# Patient Record
Sex: FEMALE | Race: WHITE | Employment: UNEMPLOYED | URBAN - METROPOLITAN AREA
[De-identification: names, ages, dates, MRNs, and addresses within clinical notes are randomized per-mention and may not be internally consistent; named-entity substitution may affect disease eponyms.]

---

## 2017-01-18 ENCOUNTER — OFFICE VISIT (OUTPATIENT)
Dept: NEUROLOGY | Age: 37
End: 2017-01-18

## 2017-01-18 VITALS
WEIGHT: 226.6 LBS | SYSTOLIC BLOOD PRESSURE: 150 MMHG | RESPIRATION RATE: 20 BRPM | DIASTOLIC BLOOD PRESSURE: 100 MMHG | HEART RATE: 104 BPM | HEIGHT: 69 IN | OXYGEN SATURATION: 98 % | BODY MASS INDEX: 33.56 KG/M2

## 2017-01-18 DIAGNOSIS — G43.109 VERTIGINOUS MIGRAINE: Primary | ICD-10-CM

## 2017-01-18 RX ORDER — RIZATRIPTAN BENZOATE 10 MG/1
10 TABLET ORAL
Qty: 9 TAB | Refills: 3 | Status: SHIPPED | OUTPATIENT
Start: 2017-01-18 | End: 2017-04-18 | Stop reason: SDUPTHER

## 2017-01-18 NOTE — PATIENT INSTRUCTIONS

## 2017-01-18 NOTE — PROGRESS NOTES
Migraines- pretty good since last visit, she had a couple of weeks off from work   Getting them 1-2 times a month, lasting 8-12 hours usually if she can get a little bit of rest they are better     Test Results:    Doppler   EEG

## 2017-01-18 NOTE — PROGRESS NOTES
Date:  2017    Name:  Martell Cooper  :  1980  MRN:  [id number]     PCP:  No primary care provider on file. Chief Complaint   Patient presents with    Neurologic Problem     test results/migraine      HISTORY OF PRESENT ILLNESS: Follow up for vertigo and migraines. Normal carotid and normal EEG. Vertigo which was occurring about once per week. Associated with headache, light and sound sensitivity. Bright lights and noise seem to trigger if there is a lot of it. Will have 1-2 migraines a month. Current Outpatient Prescriptions   Medication Sig    nortriptyline (PAMELOR) 75 mg capsule Take 1 Cap by mouth daily.  melatonin 1 mg tablet Take  by mouth nightly.  CHOLECALCIFEROL, VITAMIN D3, (VITAMIN D3 PO) Take  by mouth.  gabapentin (NEURONTIN) 300 mg capsule Take 300 mg by mouth three (3) times daily.  ibuprofen (MOTRIN) 400 mg tablet Take 400 mg by mouth two (2) times daily as needed. No current facility-administered medications for this visit.       Allergies   Allergen Reactions    Codeine Nausea and Vomiting    Demerol [Meperidine] Nausea and Vomiting    Keflex [Cephalexin] Itching    Pcn [Penicillins] Hives     Past Medical History   Diagnosis Date    Anxiety     Arrhythmia      Sinus Tach    Arthritis     Chronic pain     Fatigue     Fibromyalgia     DAVEY (generalized anxiety disorder)     Headache     Hypertension     Muscle pain     Nausea & vomiting     Other ill-defined conditions(799.89)      fibromyalgia    Other ill-defined conditions(799.89)      hypoglycemia    Snoring     Unspecified adverse effect of anesthesia      pain at IV site and pain through the vein with anesthesia    Vertigo     Visual disturbance      Past Surgical History   Procedure Laterality Date    Hx orthopaedic       right knee    Hx orthopaedic       right ankle    Hx heent       wisdom teeth     Social History     Social History    Marital status:      Spouse name: N/A    Number of children: N/A    Years of education: N/A     Occupational History    Not on file. Social History Main Topics    Smoking status: Former Smoker     Packs/day: 0.50     Years: 6.00     Quit date: 10/15/2005    Smokeless tobacco: Never Used    Alcohol use 1.0 oz/week     2 Glasses of wine per week      Comment: 1-4/week    Drug use: No    Sexual activity: Not on file     Other Topics Concern    Not on file     Social History Narrative     Family History   Problem Relation Age of Onset    Post-op Nausea/Vomiting Mother     Cancer Mother     Heart Disease Father     Cancer Maternal Uncle     Lupus Paternal Uncle     Dementia Maternal Grandmother     Heart Disease Maternal Grandmother     Stroke Maternal Grandmother     Cancer Other        PHYSICAL EXAMINATION:    Visit Vitals    BP (!) 150/100    Pulse (!) 104    Resp 20    Ht 5' 9\" (1.753 m)    Wt 102.8 kg (226 lb 9.6 oz)    SpO2 98%    BMI 33.46 kg/m2     General:  Well defined, nourished, and groomed individual in no acute distress. Neck: Supple, nontender, no bruits, no pain with resistance to active range of motion. Heart: Regular rate and rhythm, no murmurs, rub, or gallop. Normal S1S2. Lungs:  Clear to auscultation bilaterally with equal chest expansion, no cough, no wheeze  Musculoskeletal:  Extremities revealed no edema and had full range of motion of joints. Psych:  Good mood and bright affect    NEUROLOGICAL EXAMINATION:     Mental Status:   Alert and oriented to person, place, and time     Cranial Nerves:    II, III, IV, VI:  Visual acuity grossly intact. Visual fields are normal.    Pupils are equal, round, and reactive to light and accommodation. Extra-ocular movements are full and fluid. Fundoscopic exam was benign, no ptosis or nystagmus. V-XII: Hearing is grossly intact. Facial features are symmetric, with normal sensation and strength.   The palate rises symmetrically and the tongue protrudes midline. Sternocleidomastoids 5/5. Motor Examination: Normal tone, bulk, and strength, 5/5 muscle strength throughout. Coordination:  Finger to nose was normal.   No resting or intention tremor    Gait and Station:  Steady while walking. Normal arm swing. No Rhomberg or pronator drift. No muscle wasting or fasiculations noted. Reflexes:  DTRs 2+ throughout. ASSESSMENT AND PLAN    ICD-10-CM ICD-9-CM    1. Vertiginous migraine G43.109 346.00 rizatriptan (MAXALT) 10 mg tablet     Migraines with vertigo. She was provided education on migraine today to include aura, prodrome, potential triggers, non-pharmacologic and pharmacologic treatment, and pathophysiology of migraine. Written information was provided as well. She will track her headaches and bring the headache diary with her to her next office visit. Provided with maxalt for acute abortive therapy. Purpose and potential side effects were discussed and she has verbalized understanding. Follow up in three months    [facility].  Akanksha Barr

## 2017-01-18 NOTE — MR AVS SNAPSHOT
Visit Information Date & Time Provider Department Dept. Phone Encounter #  
 1/18/2017 10:00 AM Amelia Krishna NP [facility] [phone] [id number] Follow-up Instructions Return in about 3 months (around 4/18/2017). Upcoming Health Maintenance Date Due DTaP/Tdap/Td series (1 - Tdap) 2/22/2000 PAP AKA CERVICAL CYTOLOGY 2/22/2000 INFLUENZA AGE 9 TO ADULT 8/1/2016 Allergies as of 1/18/2017  Review Complete On: 1/18/2017 By: Amelia Krishna NP Severity Noted Reaction Type Reactions Codeine  10/15/2012    Nausea and Vomiting Demerol [Meperidine]  10/16/2012    Nausea and Vomiting Keflex [Cephalexin]  10/16/2012    Itching Pcn [Penicillins]  10/15/2012    Hives Current Immunizations  Never Reviewed No immunizations on file. Not reviewed this visit You Were Diagnosed With   
  
 Codes Comments Vertiginous migraine    -  Primary ICD-10-CM: M70.401 ICD-9-CM: 346.00 Vitals BP Pulse Resp Height(growth percentile) Weight(growth percentile) SpO2  
 (!) 150/100 (!) 104 20 5' 9\" (1.753 m) 226 lb 9.6 oz (102.8 kg) 98% BMI OB Status Smoking Status 33.46 kg/m2 Having regular periods Former Smoker Vitals History BMI and BSA Data Body Mass Index Body Surface Area  
 33.46 kg/m 2 2.24 m 2 Preferred Pharmacy Pharmacy Name Phone [id number] [place], [address] [place] [phone] Your Updated Medication List  
  
   
This list is accurate as of: 1/18/17 11:17 AM.  Always use your most recent med list.  
  
  
  
  
 gabapentin 300 mg capsule Commonly known as:  NEURONTIN Take 300 mg by mouth three (3) times daily. ibuprofen 400 mg tablet Commonly known as:  MOTRIN Take 400 mg by mouth two (2) times daily as needed. melatonin 1 mg tablet Take  by mouth nightly. nortriptyline 75 mg capsule Commonly known as:  PAMELOR  
 Take 1 Cap by mouth daily. rizatriptan 10 mg tablet Commonly known as:  Aster Nabor Take 1 Tab by mouth once as needed for Migraine for up to 1 dose. May repeat in 2 hours if needed VITAMIN D3 PO Take  by mouth. Prescriptions Sent to Pharmacy Refills  
 rizatriptan (MAXALT) 10 mg tablet 3 Sig: Take 1 Tab by mouth once as needed for Migraine for up to 1 dose. May repeat in 2 hours if needed Class: Normal  
 [facility] [address] [place] [address] #: [phone] Route: Oral  
  
Follow-up Instructions Return in about 3 months (around 4/18/2017). Patient Instructions A Healthy Lifestyle: Care Instructions Your Care Instructions A healthy lifestyle can help you feel good, stay at a healthy weight, and have plenty of energy for both work and play. A healthy lifestyle is something you can share with your whole family. A healthy lifestyle also can lower your risk for serious health problems, such as high blood pressure, heart disease, and diabetes. You can follow a few steps listed below to improve your health and the health of your family. Follow-up care is a key part of your treatment and safety. Be sure to make and go to all appointments, and call your doctor if you are having problems. Its also a good idea to know your test results and keep a list of the medicines you take. How can you care for yourself at home? · Do not eat too much sugar, fat, or fast foods. You can still have dessert and treats now and then. The goal is moderation. · Start small to improve your eating habits. Pay attention to portion sizes, drink less juice and soda pop, and eat more fruits and vegetables. ¨ Eat a healthy amount of food. A 3-ounce serving of meat, for example, is about the size of a deck of cards. Fill the rest of your plate with vegetables and whole grains. ¨ Limit the amount of soda and sports drinks you have every day.  Drink more water when you are thirsty. ¨ Eat at least 5 servings of fruits and vegetables every day. It may seem like a lot, but it is not hard to reach this goal. A serving or helping is 1 piece of fruit, 1 cup of vegetables, or 2 cups of leafy, raw vegetables. Have an apple or some carrot sticks as an afternoon snack instead of a candy bar. Try to have fruits and/or vegetables at every meal. 
· Make exercise part of your daily routine. You may want to start with simple activities, such as walking, bicycling, or slow swimming. Try to be active 30 to 60 minutes every day. You do not need to do all 30 to 60 minutes all at once. For example, you can exercise 3 times a day for 10 or 20 minutes. Moderate exercise is safe for most people, but it is always a good idea to talk to your doctor before starting an exercise program. 
· Keep moving. Isreal Vicenta the lawn, work in the garden, or [employer]. Take the stairs instead of the elevator at work. · If you smoke, quit. People who smoke have an increased risk for heart attack, stroke, cancer, and other lung illnesses. Quitting is hard, but there are ways to boost your chance of quitting tobacco for good. ¨ Use nicotine gum, patches, or lozenges. ¨ Ask your doctor about stop-smoking programs and medicines. ¨ Keep trying. In addition to reducing your risk of diseases in the future, you will notice some benefits soon after you stop using tobacco. If you have shortness of breath or asthma symptoms, they will likely get better within a few weeks after you quit. · Limit how much alcohol you drink. Moderate amounts of alcohol (up to 2 drinks a day for men, 1 drink a day for women) are okay. But drinking too much can lead to liver problems, high blood pressure, and other health problems. Family health If you have a family, there are many things you can do together to improve your health. · Eat meals together as a family as often as possible. · Eat healthy foods. This includes fruits, vegetables, lean meats and dairy, and whole grains. · Include your family in your fitness plan. Most people think of activities such as jogging or tennis as the way to fitness, but there are many ways you and your family can be more active. Anything that makes you breathe hard and gets your heart pumping is exercise. Here are some tips: 
¨ Walk to do errands or to take your child to school or the bus. ¨ Go for a family bike ride after dinner instead of watching TV. Where can you learn more? Go to http://rashawn-sybil.info/. Enter F068 in the search box to learn more about \"A Healthy Lifestyle: Care Instructions. \" Current as of: July 26, 2016 Content Version: 11.1 © 4575-8574 [employer]. Care instructions adapted under license by [employer] (which disclaims liability or warranty for this information). If you have questions about a medical condition or this instruction, always ask your healthcare professional. [place] [address] any warranty or liability for your use of this information. Introducing [facility] [place] & HEALTH SERVICES! Dear Sonia Sommer: Thank you for requesting a [employer] account. Our records indicate that you already have an active [employer] account. You can access your account anytime at [email or web address]. [email or web address] Did you know that you can access your hospital and ER discharge instructions at any time in [employer]? You can also review all of your test results from your hospital stay or ER visit. Additional Information If you have questions, please visit the Frequently Asked Questions section of the [employer] website at [email or web address] [email or web address]/. Remember, [employer] is NOT to be used for urgent needs. For medical emergencies, dial 911. Now available from your iPhone and Android! Please provide this summary of care documentation to your next provider. If you have any questions after today's visit, please call [phone].

## 2017-02-27 RX ORDER — NORTRIPTYLINE HYDROCHLORIDE 75 MG/1
CAPSULE ORAL
Qty: 30 CAP | Refills: 1 | Status: SHIPPED | OUTPATIENT
Start: 2017-02-27 | End: 2017-04-18 | Stop reason: DRUGHIGH

## 2017-04-18 ENCOUNTER — OFFICE VISIT (OUTPATIENT)
Dept: NEUROLOGY | Age: 37
End: 2017-04-18

## 2017-04-18 VITALS
RESPIRATION RATE: 20 BRPM | HEART RATE: 110 BPM | OXYGEN SATURATION: 99 % | SYSTOLIC BLOOD PRESSURE: 154 MMHG | DIASTOLIC BLOOD PRESSURE: 100 MMHG | BODY MASS INDEX: 31.25 KG/M2 | HEIGHT: 69 IN | WEIGHT: 211 LBS

## 2017-04-18 DIAGNOSIS — G43.109 VERTIGINOUS MIGRAINE: Primary | ICD-10-CM

## 2017-04-18 RX ORDER — CETIRIZINE HCL 10 MG
TABLET ORAL
COMMUNITY
End: 2017-07-18

## 2017-04-18 RX ORDER — RIZATRIPTAN BENZOATE 10 MG/1
10 TABLET ORAL
Qty: 9 TAB | Refills: 3 | Status: SHIPPED | OUTPATIENT
Start: 2017-04-18 | End: 2017-07-18 | Stop reason: SDUPTHER

## 2017-04-18 RX ORDER — NORTRIPTYLINE HYDROCHLORIDE 50 MG/1
100 CAPSULE ORAL
Qty: 60 CAP | Refills: 3 | Status: SHIPPED | OUTPATIENT
Start: 2017-04-18 | End: 2017-07-18 | Stop reason: SDUPTHER

## 2017-04-18 NOTE — PROGRESS NOTES
Migraines- have had some bad weeks and good weeks   Longest stretch without 1 was about 3 weeks   Have had 5 horrible migraines, maybe a couple more because she took the medication early on and it didn't linger on   Lasting about 20 hours with medication sometimes that will also include trying to sleep it off during the night and she still might have that hangover feeling in the morning

## 2017-04-18 NOTE — MR AVS SNAPSHOT
Visit Information Date & Time Provider Department Dept. Phone Encounter #  
 4/18/2017 10:30 AM Augustina Guerrero NP Neurology [facility] [phone] [id number] Upcoming Health Maintenance Date Due DTaP/Tdap/Td series (1 - Tdap) 2/22/2000 PAP AKA CERVICAL CYTOLOGY 2/22/2000 INFLUENZA AGE 9 TO ADULT 8/1/2016 Allergies as of 4/18/2017  Review Complete On: 4/18/2017 By: Augustina Guerrero NP Severity Noted Reaction Type Reactions Codeine  10/15/2012    Nausea and Vomiting Demerol [Meperidine]  10/16/2012    Nausea and Vomiting Keflex [Cephalexin]  10/16/2012    Itching Pcn [Penicillins]  10/15/2012    Hives Current Immunizations  Never Reviewed No immunizations on file. Not reviewed this visit You Were Diagnosed With   
  
 Codes Comments Vertiginous migraine    -  Primary ICD-10-CM: M98.015 ICD-9-CM: 346.00 Vitals BP Pulse Resp Height(growth percentile) Weight(growth percentile) SpO2  
 (!) 154/100 (!) 110 20 5' 9\" (1.753 m) 211 lb (95.7 kg) 99% BMI OB Status Smoking Status 31.16 kg/m2 Having regular periods Former Smoker Vitals History BMI and BSA Data Body Mass Index Body Surface Area  
 31.16 kg/m 2 2.16 m 2 Preferred Pharmacy Pharmacy Name Phone [id number] [place], [address] [place] [phone] Your Updated Medication List  
  
   
This list is accurate as of: 4/18/17 11:23 AM.  Always use your most recent med list.  
  
  
  
  
 gabapentin 300 mg capsule Commonly known as:  NEURONTIN Take 300 mg by mouth three (3) times daily. ibuprofen 400 mg tablet Commonly known as:  MOTRIN Take 400 mg by mouth two (2) times daily as needed. melatonin 1 mg tablet Take  by mouth nightly. nortriptyline 50 mg capsule Commonly known as:  PAMELOR Take 2 Caps by mouth nightly. rizatriptan 10 mg tablet Commonly known as:  Franca Jerez Take 1 Tab by mouth once as needed for Migraine for up to 1 dose. May repeat in 2 hours if needed VITAMIN D3 PO Take  by mouth. ZyrTEC 10 mg tablet Generic drug:  cetirizine Take  by mouth. Prescriptions Sent to Pharmacy Refills  
 nortriptyline (PAMELOR) 50 mg capsule 3 Sig: Take 2 Caps by mouth nightly. Class: Normal  
 [facility] #: [phone] Route: Oral  
 rizatriptan (MAXALT) 10 mg tablet 3 Sig: Take 1 Tab by mouth once as needed for Migraine for up to 1 dose. May repeat in 2 hours if needed Class: Normal  
 [facility] #: [phone] Route: Oral  
  
Patient Instructions A Healthy Lifestyle: Care Instructions Your Care Instructions A healthy lifestyle can help you feel good, stay at a healthy weight, and have plenty of energy for both work and play. A healthy lifestyle is something you can share with your whole family. A healthy lifestyle also can lower your risk for serious health problems, such as high blood pressure, heart disease, and diabetes. You can follow a few steps listed below to improve your health and the health of your family. Follow-up care is a key part of your treatment and safety. Be sure to make and go to all appointments, and call your doctor if you are having problems. Its also a good idea to know your test results and keep a list of the medicines you take. How can you care for yourself at home? · Do not eat too much sugar, fat, or fast foods. You can still have dessert and treats now and then. The goal is moderation. · Start small to improve your eating habits. Pay attention to portion sizes, drink less juice and soda pop, and eat more fruits and vegetables. ¨ Eat a healthy amount of food. A 3-ounce serving of meat, for example, is about the size of a deck of cards.  Fill the rest of your plate with vegetables and whole grains. ¨ Limit the amount of soda and sports drinks you have every day. Drink more water when you are thirsty. ¨ Eat at least 5 servings of fruits and vegetables every day. It may seem like a lot, but it is not hard to reach this goal. A serving or helping is 1 piece of fruit, 1 cup of vegetables, or 2 cups of leafy, raw vegetables. Have an apple or some carrot sticks as an afternoon snack instead of a candy bar. Try to have fruits and/or vegetables at every meal. 
· Make exercise part of your daily routine. You may want to start with simple activities, such as walking, bicycling, or slow swimming. Try to be active 30 to 60 minutes every day. You do not need to do all 30 to 60 minutes all at once. For example, you can exercise 3 times a day for 10 or 20 minutes. Moderate exercise is safe for most people, but it is always a good idea to talk to your doctor before starting an exercise program. 
· Keep moving. Sam[employer] the lawn, work in the garden, or [employer]. Take the stairs instead of the elevator at work. · If you smoke, quit. People who smoke have an increased risk for heart attack, stroke, cancer, and other lung illnesses. Quitting is hard, but there are ways to boost your chance of quitting tobacco for good. ¨ Use nicotine gum, patches, or lozenges. ¨ Ask your doctor about stop-smoking programs and medicines. ¨ Keep trying. In addition to reducing your risk of diseases in the future, you will notice some benefits soon after you stop using tobacco. If you have shortness of breath or asthma symptoms, they will likely get better within a few weeks after you quit. · Limit how much alcohol you drink. Moderate amounts of alcohol (up to 2 drinks a day for men, 1 drink a day for women) are okay. But drinking too much can lead to liver problems, high blood pressure, and other health problems. Family health If you have a family, there are many things you can do together to improve your health. · Eat meals together as a family as often as possible. · Eat healthy foods. This includes fruits, vegetables, lean meats and dairy, and whole grains. · Include your family in your fitness plan. Most people think of activities such as jogging or tennis as the way to fitness, but there are many ways you and your family can be more active. Anything that makes you breathe hard and gets your heart pumping is exercise. Here are some tips: 
¨ Walk to do errands or to take your child to school or the bus. ¨ Go for a family bike ride after dinner instead of watching TV. Where can you learn more? Go to http://rashawnStuRents.comsybil.info/. Enter G773 in the search box to learn more about \"A Healthy Lifestyle: Care Instructions. \" Current as of: July 26, 2016 Content Version: 11.2 © 9541-7217 Insignia Technologies. Care instructions adapted under license by "[employer] (which disclaims liability or warranty for this information). If you have questions about a medical condition or this instruction, always ask your healthcare professional. [address] any warranty or liability for your use of this information. Introducing [facility] & HEALTH SERVICES! Dear Alec Norris: Thank you for requesting a [employer] account. Our records indicate that you already have an active [employer] account. You can access your account anytime at [email or web address]. "[email or web address]/B[employer] Charge Did you know that you can access your hospital and ER discharge instructions at any time in [employer]? You can also review all of your test results from your hospital stay or ER visit. Additional Information If you have questions, please visit the Frequently Asked Questions section of the [employer] website at [email or web address]. "[email or web address]/B[employer]/. Remember, [employer] is NOT to be used for urgent needs. For medical emergencies, dial 911. Now available from your iPhone and Android! Please provide this summary of care documentation to your next provider. If you have any questions after today's visit, please call [phone].

## 2017-04-18 NOTE — PROGRESS NOTES
Date:  17    Name:  Kimani Berger  :  1980  MRN:  [id number]     PCP:  No primary care provider on file. Chief Complaint   Patient presents with    Migraine     HISTORY OF PRESENT ILLNESS: Follow up for migraine with vertigo. Last office visit, she was reporting about two migraines per month. She was given Maxalt for acute abortive therapy. Reports about five migraines since last office visit that were not well managed with the Maxalt. She has had to redose and this does seem to help. Otherwise, the Maxalt has helped she feels. She had a couple others that did not linger. In tracking the headaches, she notes that if she forgets the Pamelor, lack of sleep, and certain light. Notes fatigue as a prodrome and visual disturbances as an aura. Current Outpatient Prescriptions   Medication Sig    cetirizine (ZYRTEC) 10 mg tablet Take  by mouth.  nortriptyline (PAMELOR) 75 mg capsule TAKE 1 CAP BY MOUTH DAILY.  rizatriptan (MAXALT) 10 mg tablet Take 1 Tab by mouth once as needed for Migraine for up to 1 dose. May repeat in 2 hours if needed    melatonin 1 mg tablet Take  by mouth nightly.  CHOLECALCIFEROL, VITAMIN D3, (VITAMIN D3 PO) Take  by mouth.  gabapentin (NEURONTIN) 300 mg capsule Take 300 mg by mouth three (3) times daily.  ibuprofen (MOTRIN) 400 mg tablet Take 400 mg by mouth two (2) times daily as needed. No current facility-administered medications for this visit.       Allergies   Allergen Reactions    Codeine Nausea and Vomiting    Demerol [Meperidine] Nausea and Vomiting    Keflex [Cephalexin] Itching    Pcn [Penicillins] Hives     Past Medical History:   Diagnosis Date    Anxiety     Arrhythmia     Sinus Tach    Arthritis     Chronic pain     Fatigue     Fibromyalgia     DAVEY (generalized anxiety disorder)     Headache     Hypertension     Muscle pain     Nausea & vomiting     Other ill-defined conditions     fibromyalgia    Other ill-defined conditions     hypoglycemia    Snoring     Unspecified adverse effect of anesthesia     pain at IV site and pain through the vein with anesthesia    Vertigo     Visual disturbance      Past Surgical History:   Procedure Laterality Date    HX HEENT  5-2012    wisdom teeth    HX ORTHOPAEDIC  1993    right knee    HX ORTHOPAEDIC  2001    right ankle     Social History     Social History    Marital status:      Spouse name: N/A    Number of children: N/A    Years of education: N/A     Occupational History    Not on file. Social History Main Topics    Smoking status: Former Smoker     Packs/day: 0.50     Years: 6.00     Quit date: 10/15/2005    Smokeless tobacco: Never Used    Alcohol use 1.0 oz/week     2 Glasses of wine per week      Comment: 1-4/week    Drug use: No    Sexual activity: Not on file     Other Topics Concern    Not on file     Social History Narrative     Family History   Problem Relation Age of Onset    Post-op Nausea/Vomiting Mother     Cancer Mother     Heart Disease Father     Cancer Maternal Uncle     Lupus Paternal Uncle     Dementia Maternal Grandmother     Heart Disease Maternal Grandmother     Stroke Maternal Grandmother     Cancer Other        PHYSICAL EXAMINATION:    Visit Vitals    BP (!) 154/100    Pulse (!) 110    Resp 20    Ht 5' 9\" (1.753 m)    Wt 95.7 kg (211 lb)    SpO2 99%    BMI 31.16 kg/m2     General: Well defined, nourished, and groomed individual in no acute distress. Neck: Supple, nontender, no bruits, no pain with resistance to active range of motion. Heart: Regular rate and rhythm, no murmurs, rub, or gallop. Normal S1S2. Lungs: Clear to auscultation bilaterally with equal chest expansion, no cough, no wheeze  Musculoskeletal: Extremities revealed no edema and had full range of motion of joints.    Psych: Good mood and bright affect     NEUROLOGICAL EXAMINATION:   Mental Status: Alert and oriented to person, place, and time      Cranial Nerves:   II, III, IV, VI: Visual acuity grossly intact. Visual fields are normal.   Pupils are equal, round, and reactive to light and accommodation. Extra-ocular movements are full and fluid. Fundoscopic exam was benign, no ptosis or nystagmus. V-XII: Hearing is grossly intact. Facial features are symmetric, with normal sensation and strength. The palate rises symmetrically and the tongue protrudes midline. Sternocleidomastoids 5/5.      Motor Examination: Normal tone, bulk, and strength, 5/5 muscle strength throughout.     Coordination: Finger to nose was normal. No resting or intention tremor     Gait and Station: Steady while walking. Normal arm swing. No Rhomberg or pronator drift. No muscle wasting or fasiculations noted.      Reflexes: DTRs 2+ throughout. ASSESSMENT AND PLAN    ICD-10-CM ICD-9-CM    1. Vertiginous migraine G43.109 346.00 nortriptyline (PAMELOR) 50 mg capsule      rizatriptan (MAXALT) 10 mg tablet     Still having about two migraines a month but some of them are still intractable and she has missed work. Will increase the Pamelor slightly for effect. She should continue to track her headaches to include the aura, prodromal symptoms, and the potential triggers. Discussed early acute intervention. Continue with Maxalt. Reiterated previous migraine teaching and provided with new education materials. Follow up in three months. More than 50% of today's 25+minute office visit was spent in reeducation regarding the migraines. Jena Bianchi

## 2017-04-18 NOTE — PATIENT INSTRUCTIONS

## 2017-07-03 DIAGNOSIS — G43.109 VERTIGINOUS MIGRAINE: ICD-10-CM

## 2017-07-05 RX ORDER — RIZATRIPTAN BENZOATE 10 MG/1
TABLET ORAL
Qty: 9 TAB | Refills: 3 | Status: SHIPPED | OUTPATIENT
Start: 2017-07-05 | End: 2017-07-18 | Stop reason: SDUPTHER

## 2017-07-18 ENCOUNTER — OFFICE VISIT (OUTPATIENT)
Dept: NEUROLOGY | Age: 37
End: 2017-07-18

## 2017-07-18 VITALS
DIASTOLIC BLOOD PRESSURE: 90 MMHG | HEIGHT: 69 IN | WEIGHT: 203.8 LBS | OXYGEN SATURATION: 99 % | RESPIRATION RATE: 20 BRPM | SYSTOLIC BLOOD PRESSURE: 140 MMHG | BODY MASS INDEX: 30.18 KG/M2 | HEART RATE: 108 BPM

## 2017-07-18 DIAGNOSIS — G43.109 VERTIGINOUS MIGRAINE: ICD-10-CM

## 2017-07-18 RX ORDER — NORTRIPTYLINE HYDROCHLORIDE 50 MG/1
100 CAPSULE ORAL
Qty: 60 CAP | Refills: 3 | Status: SHIPPED | OUTPATIENT
Start: 2017-07-18 | End: 2018-01-15 | Stop reason: SDUPTHER

## 2017-07-18 RX ORDER — RIZATRIPTAN BENZOATE 10 MG/1
10 TABLET ORAL
Qty: 9 TAB | Refills: 3 | Status: SHIPPED | OUTPATIENT
Start: 2017-07-18 | End: 2018-01-15 | Stop reason: SDUPTHER

## 2017-07-18 NOTE — PATIENT INSTRUCTIONS

## 2017-07-18 NOTE — MR AVS SNAPSHOT
Visit Information Date & Time Provider Department Dept. Phone Encounter #  
 7/18/2017 11:30 AM KOREY Reece [facility] [phone] [id number] Upcoming Health Maintenance Date Due DTaP/Tdap/Td series (1 - Tdap) 2/22/2000 PAP AKA CERVICAL CYTOLOGY 2/22/2000 INFLUENZA AGE 9 TO ADULT 8/1/2017 Allergies as of 7/18/2017  Review Complete On: 7/18/2017 By: Judy Cain NP Severity Noted Reaction Type Reactions Codeine  10/15/2012    Nausea and Vomiting Demerol [Meperidine]  10/16/2012    Nausea and Vomiting Keflex [Cephalexin]  10/16/2012    Itching Pcn [Penicillins]  10/15/2012    Hives Current Immunizations  Never Reviewed No immunizations on file. Not reviewed this visit You Were Diagnosed With   
  
 Codes Comments Vertiginous migraine     ICD-10-CM: O28.951 ICD-9-CM: 346.00 Vitals BP Pulse Resp Height(growth percentile) Weight(growth percentile) SpO2  
 140/90 (!) 108 20 5' 9\" (1.753 m) 203 lb 12.8 oz (92.4 kg) 99% BMI OB Status Smoking Status 30.1 kg/m2 Having regular periods Former Smoker Vitals History BMI and BSA Data Body Mass Index Body Surface Area  
 30.1 kg/m 2 2.12 m 2 Preferred Pharmacy Pharmacy Name Phone [id number] [place], [address] [place] [phone] Your Updated Medication List  
  
   
This list is accurate as of: 7/18/17 12:22 PM.  Always use your most recent med list.  
  
  
  
  
 gabapentin 300 mg capsule Commonly known as:  NEURONTIN Take 300 mg by mouth three (3) times daily. ibuprofen 400 mg tablet Commonly known as:  MOTRIN Take 400 mg by mouth two (2) times daily as needed. melatonin 1 mg tablet Take  by mouth nightly. nortriptyline 50 mg capsule Commonly known as:  PAMELOR Take 2 Caps by mouth nightly. rizatriptan 10 mg tablet Commonly known as:  Kasandra Thurston Take 1 Tab by mouth once as needed for Migraine for up to 1 dose. May repeat in 2 hours if needed Prescriptions Sent to Pharmacy Refills  
 nortriptyline (PAMELOR) 50 mg capsule 3 Sig: Take 2 Caps by mouth nightly. Class: Normal  
 [facility] [address] [place] [address] #: [phone] Route: Oral  
 rizatriptan (MAXALT) 10 mg tablet 3 Sig: Take 1 Tab by mouth once as needed for Migraine for up to 1 dose. May repeat in 2 hours if needed Class: Normal  
 Pharmacy: [address] Ph #: [phone] Route: Oral  
  
Patient Instructions A Healthy Lifestyle: Care Instructions Your Care Instructions A healthy lifestyle can help you feel good, stay at a healthy weight, and have plenty of energy for both work and play. A healthy lifestyle is something you can share with your whole family. A healthy lifestyle also can lower your risk for serious health problems, such as high blood pressure, heart disease, and diabetes. You can follow a few steps listed below to improve your health and the health of your family. Follow-up care is a key part of your treatment and safety. Be sure to make and go to all appointments, and call your doctor if you are having problems. Its also a good idea to know your test results and keep a list of the medicines you take. How can you care for yourself at home? · Do not eat too much sugar, fat, or fast foods. You can still have dessert and treats now and then. The goal is moderation. · Start small to improve your eating habits. Pay attention to portion sizes, drink less juice and soda pop, and eat more fruits and vegetables. ¨ Eat a healthy amount of food. A 3-ounce serving of meat, for example, is about the size of a deck of cards. Fill the rest of your plate with vegetables and whole grains. ¨ Limit the amount of soda and sports drinks you have every day.  Drink more water when you are thirsty. ¨ Eat at least 5 servings of fruits and vegetables every day. It may seem like a lot, but it is not hard to reach this goal. A serving or helping is 1 piece of fruit, 1 cup of vegetables, or 2 cups of leafy, raw vegetables. Have an apple or some carrot sticks as an afternoon snack instead of a candy bar. Try to have fruits and/or vegetables at every meal. 
· Make exercise part of your daily routine. You may want to start with simple activities, such as walking, bicycling, or slow swimming. Try to be active 30 to 60 minutes every day. You do not need to do all 30 to 60 minutes all at once. For example, you can exercise 3 times a day for 10 or 20 minutes. Moderate exercise is safe for most people, but it is always a good idea to talk to your doctor before starting an exercise program. 
· Keep moving. [place] the lawn, work in the garden, or [employer]. Take the stairs instead of the elevator at work. · If you smoke, quit. People who smoke have an increased risk for heart attack, stroke, cancer, and other lung illnesses. Quitting is hard, but there are ways to boost your chance of quitting tobacco for good. ¨ Use nicotine gum, patches, or lozenges. ¨ Ask your doctor about stop-smoking programs and medicines. ¨ Keep trying. In addition to reducing your risk of diseases in the future, you will notice some benefits soon after you stop using tobacco. If you have shortness of breath or asthma symptoms, they will likely get better within a few weeks after you quit. · Limit how much alcohol you drink. Moderate amounts of alcohol (up to 2 drinks a day for men, 1 drink a day for women) are okay. But drinking too much can lead to liver problems, high blood pressure, and other health problems. Family health If you have a family, there are many things you can do together to improve your health. · Eat meals together as a family as often as possible. · Eat healthy foods. This includes fruits, vegetables, lean meats and dairy, and whole grains. · Include your family in your fitness plan. Most people think of activities such as jogging or tennis as the way to fitness, but there are many ways you and your family can be more active. Anything that makes you breathe hard and gets your heart pumping is exercise. Here are some tips: 
¨ Walk to do errands or to take your child to school or the bus. ¨ Go for a family bike ride after dinner instead of watching TV. Where can you learn more? Go to http://rashawnVMG Mediasybil.info/. Enter A847 in the search box to learn more about \"A Healthy Lifestyle: Care Instructions. \" Current as of: July 26, 2016 Content Version: 11.3 © 9717-1012 [employer]. Care instructions adapted under license by [employer] (which disclaims liability or warranty for this information). If you have questions about a medical condition or this instruction, always ask your healthcare professional. [place] [address] any warranty or liability for your use of this information. Introducing [facility]! Dear Leslie Standard: Thank you for requesting a iDoc24 account. Our records indicate that you already have an active iDoc24 account. You can access your account anytime at [email or web address] Did you know that you can access your hospital and ER discharge instructions at any time in iDoc24? You can also review all of your test results from your hospital stay or ER visit. Additional Information If you have questions, please visit the Frequently Asked Questions section of the iDoc24 website at [email or web address] Remember, iDoc24 is NOT to be used for urgent needs. For medical emergencies, dial 911. Now available from your iPhone and Android! Please provide this summary of care documentation to your next provider. If you have any questions after today's visit, please call [phone].

## 2017-07-18 NOTE — PROGRESS NOTES
Migraines- have been okay actually a little bit better  Better in the frequency- 1 every two weeks, lasting a couple of hours with the maxalt she might have to use the second dose

## 2017-07-27 NOTE — PROGRESS NOTES
Date:  17     Name:  Charles Covert  :  1980  MRN:  [id number]     PCP:  No primary care provider on file. Chief Complaint   Patient presents with    Migraine       HISTORY OF PRESENT ILLNESS: Follow-up for vertiginous migraine. At her last office visit, she is reeducated on migraine and instructed to increase her Pamelor slightly to 100 mg nightly. She indicates today that she feels that the headaches are a bit better. At this point, she is having 1 approximately every 2 weeks. These can last a couple of hours. She will use the Maxalt and sometimes will have to use the second dose. Except as noted above, denies  fever, chills, cough. No CP or SOB. No dysuria, loss of bowel or bladder control. No Weight loss. Appetite good. Sleeping well. No sweats. No edema. No bruising or bleeding. No nausea or vomit. No diarrhea. No frequency, urgency, No depressive sxs. No anxiety. Denies sore throat, nasal congestion, nasal discharge, epistaxis, tinnitus, hearing loss, back pain, muscle pain, or joint pain. Current Outpatient Prescriptions   Medication Sig    nortriptyline (PAMELOR) 50 mg capsule Take 2 Caps by mouth nightly.  rizatriptan (MAXALT) 10 mg tablet Take 1 Tab by mouth once as needed for Migraine for up to 1 dose. May repeat in 2 hours if needed    melatonin 1 mg tablet Take  by mouth nightly.  gabapentin (NEURONTIN) 300 mg capsule Take 300 mg by mouth three (3) times daily.  ibuprofen (MOTRIN) 400 mg tablet Take 400 mg by mouth two (2) times daily as needed. No current facility-administered medications for this visit.       Allergies   Allergen Reactions    Codeine Nausea and Vomiting    Demerol [Meperidine] Nausea and Vomiting    Keflex [Cephalexin] Itching    Pcn [Penicillins] Hives     Past Medical History:   Diagnosis Date    Anxiety     Arrhythmia     Sinus Tach    Arthritis     Chronic pain     Fatigue     Fibromyalgia     DAVEY (generalized anxiety disorder)     Headache     Hypertension     Muscle pain     Nausea & vomiting     Other ill-defined conditions     fibromyalgia    Other ill-defined conditions     hypoglycemia    Snoring     Unspecified adverse effect of anesthesia     pain at IV site and pain through the vein with anesthesia    Vertigo     Visual disturbance      Past Surgical History:   Procedure Laterality Date    HX HEENT  5-2012    wisdom teeth    HX ORTHOPAEDIC  1993    right knee    HX ORTHOPAEDIC  2001    right ankle     Social History     Social History    Marital status:      Spouse name: N/A    Number of children: N/A    Years of education: N/A     Occupational History    Not on file. Social History Main Topics    Smoking status: Former Smoker     Packs/day: 0.50     Years: 6.00     Quit date: 10/15/2005    Smokeless tobacco: Never Used    Alcohol use 1.0 oz/week     2 Glasses of wine per week      Comment: 1-4/week    Drug use: No    Sexual activity: Not on file     Other Topics Concern    Not on file     Social History Narrative     Family History   Problem Relation Age of Onset    Post-op Nausea/Vomiting Mother     Cancer Mother     Heart Disease Father     Cancer Maternal Uncle     Lupus Paternal Uncle     Dementia Maternal Grandmother     Heart Disease Maternal Grandmother     Stroke Maternal Grandmother     Cancer Other        PHYSICAL EXAMINATION:    Visit Vitals    /90    Pulse (!) 108    Resp 20    Ht 5' 9\" (1.753 m)    Wt 92.4 kg (203 lb 12.8 oz)    SpO2 99%    BMI 30.1 kg/m2     General: Well defined, nourished, and groomed individual in no acute distress. Neck: Supple, nontender, no bruits, no pain with resistance to active range of motion. Heart: Regular rate and rhythm, no murmurs, rub, or gallop. Normal S1S2.   Lungs: Clear to auscultation bilaterally with equal chest expansion, no cough, no wheeze  Musculoskeletal: Extremities revealed no edema and had full range of motion of joints. Psych: Good mood and bright affect      NEUROLOGICAL EXAMINATION:   Mental Status: Alert and oriented to person, place, and time       Cranial Nerves:   II, III, IV, VI: Visual acuity grossly intact. Visual fields are normal.   Pupils are equal, round, and reactive to light and accommodation. Extra-ocular movements are full and fluid. Fundoscopic exam was benign, no ptosis or nystagmus. V-XII: Hearing is grossly intact. Facial features are symmetric, with normal sensation and strength. The palate rises symmetrically and the tongue protrudes midline. Sternocleidomastoids 5/5.       Motor Examination: Normal tone, bulk, and strength, 5/5 muscle strength throughout.      Coordination: Finger to nose was normal. No resting or intention tremor      Gait and Station: Steady while walking. Normal arm swing. No Rhomberg or pronator drift. No muscle wasting or fasiculations noted.       Reflexes: DTRs 2+ throughout. ASSESSMENT AND PLAN    ICD-10-CM ICD-9-CM    1. Vertiginous migraine G43.109 346.00 nortriptyline (PAMELOR) 50 mg capsule      rizatriptan (MAXALT) 10 mg tablet     Improved vertiginous migraine since increasing nortriptyline to milligrams nightly. Continue track headaches. Continued use Maxalt as needed. Follow-up in 3 months or sooner if needed. Jena Barrera

## 2018-01-08 ENCOUNTER — TELEPHONE (OUTPATIENT)
Dept: NEUROLOGY | Age: 38
End: 2018-01-08

## 2018-01-08 NOTE — TELEPHONE ENCOUNTER
Patient is having a migraine and has a few questions because she has already taken something for it about 14 hours ago and wants to take something else to see if it would help

## 2018-01-15 ENCOUNTER — OFFICE VISIT (OUTPATIENT)
Dept: NEUROLOGY | Age: 38
End: 2018-01-15

## 2018-01-15 VITALS
RESPIRATION RATE: 20 BRPM | SYSTOLIC BLOOD PRESSURE: 140 MMHG | OXYGEN SATURATION: 98 % | WEIGHT: 207 LBS | HEART RATE: 108 BPM | DIASTOLIC BLOOD PRESSURE: 90 MMHG | HEIGHT: 69 IN | BODY MASS INDEX: 30.66 KG/M2

## 2018-01-15 DIAGNOSIS — G43.109 VERTIGINOUS MIGRAINE: Primary | ICD-10-CM

## 2018-01-15 DIAGNOSIS — I10 ESSENTIAL HYPERTENSION: ICD-10-CM

## 2018-01-15 RX ORDER — NORTRIPTYLINE HYDROCHLORIDE 50 MG/1
100 CAPSULE ORAL
Qty: 60 CAP | Refills: 3 | Status: SHIPPED | OUTPATIENT
Start: 2018-01-15 | End: 2018-06-11 | Stop reason: SDUPTHER

## 2018-01-15 RX ORDER — RIZATRIPTAN BENZOATE 10 MG/1
10 TABLET ORAL
Qty: 9 TAB | Refills: 3 | Status: SHIPPED | OUTPATIENT
Start: 2018-01-15 | End: 2018-05-24 | Stop reason: SDUPTHER

## 2018-01-15 NOTE — PATIENT INSTRUCTIONS

## 2018-01-15 NOTE — PROGRESS NOTES
Migraines- last week was a really bad week but before that having 3-4 a month, she can see the symptoms coming on and she can take the maxalt and it will cut it down pretty quickly   Lasting 2-3 hours with maxalt if she can take it early enough usually in combination with sleep because they are typically at the end of the day

## 2018-01-15 NOTE — PROGRESS NOTES
Date:  01/15/18     Name:  Mohinder Juan  :  1980  MRN:  [id number]     PCP:  None    Chief Complaint   Patient presents with    Migraine     HISTORY OF PRESENT ILLNESS: Follow up for migraines. Still has at least two migraines a month. Possibly has more but admits that she has not used her tracking torsten like she was. However, she feels that it is likely about two per month. The Maxalt helps to abort the headaches particularly when she is able to take the Maxalt early. Except as noted above, denies  fever, chills, cough. No CP or SOB. No dysuria, loss of bowel or bladder control. No Weight loss. Appetite good. Sleeping well. No sweats. No edema. No bruising or bleeding. No nausea or vomit. No diarrhea. No frequency, urgency, No depressive sxs. No anxiety. Denies sore throat, nasal congestion, nasal discharge, epistaxis, tinnitus, hearing loss, back pain, muscle pain, or joint pain. Current Outpatient Prescriptions   Medication Sig    nortriptyline (PAMELOR) 50 mg capsule Take 2 Caps by mouth nightly.  rizatriptan (MAXALT) 10 mg tablet Take 1 Tab by mouth once as needed for Migraine for up to 1 dose. May repeat in 2 hours if needed    melatonin 1 mg tablet Take  by mouth nightly.  gabapentin (NEURONTIN) 300 mg capsule Take 300 mg by mouth three (3) times daily.  ibuprofen (MOTRIN) 400 mg tablet Take 400 mg by mouth two (2) times daily as needed. No current facility-administered medications for this visit.       Allergies   Allergen Reactions    Codeine Nausea and Vomiting    Demerol [Meperidine] Nausea and Vomiting    Keflex [Cephalexin] Itching    Pcn [Penicillins] Hives     Past Medical History:   Diagnosis Date    Anxiety     Arrhythmia     Sinus Tach    Arthritis     Chronic pain     Fatigue     Fibromyalgia     DAVEY (generalized anxiety disorder)     Headache     Hypertension     Muscle pain     Nausea & vomiting     Other ill-defined conditions(799.89)     fibromyalgia    Other ill-defined conditions(799.89)     hypoglycemia    Snoring     Unspecified adverse effect of anesthesia     pain at IV site and pain through the vein with anesthesia    Vertigo     Visual disturbance      Past Surgical History:   Procedure Laterality Date    HX HEENT  5-2012    wisdom teeth    HX ORTHOPAEDIC  1993    right knee    HX ORTHOPAEDIC  2001    right ankle     Social History     Social History    Marital status:      Spouse name: N/A    Number of children: N/A    Years of education: N/A     Occupational History    Not on file. Social History Main Topics    Smoking status: Former Smoker     Packs/day: 0.50     Years: 6.00     Quit date: 10/15/2005    Smokeless tobacco: Never Used    Alcohol use 1.0 oz/week     2 Glasses of wine per week      Comment: 1-4/week    Drug use: No    Sexual activity: Not on file     Other Topics Concern    Not on file     Social History Narrative     Family History   Problem Relation Age of Onset    Post-op Nausea/Vomiting Mother     Cancer Mother     Heart Disease Father     Cancer Maternal Uncle     Lupus Paternal Uncle     Dementia Maternal Grandmother     Heart Disease Maternal Grandmother     Stroke Maternal Grandmother     Cancer Other        PHYSICAL EXAMINATION:    Visit Vitals    /90 (BP 1 Location: Left arm, BP Patient Position: Sitting)    Pulse (!) 108    Resp 20    Ht 5' 9\" (1.753 m)    Wt 93.9 kg (207 lb)    SpO2 98%    BMI 30.57 kg/m2     General: Well defined, nourished, and groomed individual in no acute distress. Neck: Supple, nontender, no bruits, no pain with resistance to active range of motion. Heart: Regular rate and rhythm, no murmurs, rub, or gallop. Normal S1S2. Lungs: Clear to auscultation bilaterally with equal chest expansion, no cough, no wheeze  Musculoskeletal: Extremities revealed no edema and had full range of motion of joints.    Psych: Good mood and bright affect      NEUROLOGICAL EXAMINATION:   Mental Status: Alert and oriented to person, place, and time       Cranial Nerves:   II, III, IV, VI: Visual acuity grossly intact. Visual fields are normal.   Pupils are equal, round, and reactive to light and accommodation. Extra-ocular movements are full and fluid. Fundoscopic exam was benign, no ptosis or nystagmus. V-XII: Hearing is grossly intact. Facial features are symmetric, with normal sensation and strength. The palate rises symmetrically and the tongue protrudes midline. Sternocleidomastoids 5/5.       Motor Examination: Normal tone, bulk, and strength, 5/5 muscle strength throughout.      Coordination: Finger to nose was normal. No resting or intention tremor      Gait and Station: Steady while walking. Normal arm swing. No Rhomberg or pronator drift. No muscle wasting or fasiculations noted. Reflexes: DTRs 2+ throughout.      ASSESSMENT AND PLAN    ICD-10-CM ICD-9-CM    1. Vertiginous migraine G43.109 346.00 nortriptyline (PAMELOR) 50 mg capsule      rizatriptan (MAXALT) 10 mg tablet   2. Essential hypertension I10 401. 9      Two to four migraines per month which are manageable with Maxalt. Continue with Pamelor nightly for prevention and Maxalt as needed. Discussed her elevated blood pressure readings today. Initial reading was 160/100. Recheck was 140/90. Of note, she has had a 10% reduction in weight over the last year with no change in her blood pressure readings over the same period of time and her blood pressure had been normal prior to this. Recommended she track her blood pressures daily and follow up with her PCP for possible intervention if they are staying elevated. Discussed the possible issues with use of Maxalt and other triptans in the setting of uncontrolled hypertension. She verbalized understanding. Follow up in six months or sooner if needed. Jena Lopez

## 2018-01-15 NOTE — MR AVS SNAPSHOT
Visit Information Date & Time Provider Department Dept. Phone Encounter #  
 1/15/2018 11:30 AM Amy Kate NP [facility] [phone] [id number] Follow-up Instructions Return in about 6 months (around 7/15/2018). Upcoming Health Maintenance Date Due DTaP/Tdap/Td series (1 - Tdap) 2/22/2000 PAP AKA CERVICAL CYTOLOGY 2/22/2000 Influenza Age 5 to Adult 8/1/2017 Allergies as of 1/15/2018  Review Complete On: 1/15/2018 By: Amy Kate NP Severity Noted Reaction Type Reactions Codeine  10/15/2012    Nausea and Vomiting Demerol [Meperidine]  10/16/2012    Nausea and Vomiting Keflex [Cephalexin]  10/16/2012    Itching Pcn [Penicillins]  10/15/2012    Hives Current Immunizations  Never Reviewed No immunizations on file. Not reviewed this visit You Were Diagnosed With   
  
 Codes Comments Vertiginous migraine    -  Primary ICD-10-CM: R65.067 ICD-9-CM: 346.00 Vitals BP Pulse Resp Height(growth percentile) Weight(growth percentile) SpO2  
 140/90 (BP 1 Location: Left arm, BP Patient Position: Sitting) (!) 108 20 5' 9\" (1.753 m) 207 lb (93.9 kg) 98% BMI OB Status Smoking Status 30.57 kg/m2 Having regular periods Former Smoker Vitals History BMI and BSA Data Body Mass Index Body Surface Area 30.57 kg/m 2 2.14 m 2 Preferred Pharmacy Pharmacy Name Phone [id number] [place], [address] [place] [phone] Your Updated Medication List  
  
   
This list is accurate as of: 1/15/18 12:12 PM.  Always use your most recent med list.  
  
  
  
  
 gabapentin 300 mg capsule Commonly known as:  NEURONTIN Take 300 mg by mouth three (3) times daily. ibuprofen 400 mg tablet Commonly known as:  MOTRIN Take 400 mg by mouth two (2) times daily as needed. melatonin 1 mg tablet Take  by mouth nightly. nortriptyline 50 mg capsule Commonly known as:  PAMELOR Take 2 Caps by mouth nightly. rizatriptan 10 mg tablet Commonly known as:  Daved Better Take 1 Tab by mouth once as needed for Migraine for up to 1 dose. May repeat in 2 hours if needed Prescriptions Sent to Pharmacy Refills  
 nortriptyline (PAMELOR) 50 mg capsule 3 Sig: Take 2 Caps by mouth nightly. Class: Normal  
 [facility] [address] [place], [place] [address] Ph #: [phone] Route: Oral  
 rizatriptan (MAXALT) 10 mg tablet 3 Sig: Take 1 Tab by mouth once as needed for Migraine for up to 1 dose. May repeat in 2 hours if needed Class: Normal  
 [facility] Ph #: [phone] Route: Oral  
  
Follow-up Instructions Return in about 6 months (around 7/15/2018). Patient Instructions A Healthy Lifestyle: Care Instructions Your Care Instructions A healthy lifestyle can help you feel good, stay at a healthy weight, and have plenty of energy for both work and play. A healthy lifestyle is something you can share with your whole family. A healthy lifestyle also can lower your risk for serious health problems, such as high blood pressure, heart disease, and diabetes. You can follow a few steps listed below to improve your health and the health of your family. Follow-up care is a key part of your treatment and safety. Be sure to make and go to all appointments, and call your doctor if you are having problems. It's also a good idea to know your test results and keep a list of the medicines you take. How can you care for yourself at home? · Do not eat too much sugar, fat, or fast foods. You can still have dessert and treats now and then. The goal is moderation. · Start small to improve your eating habits. Pay attention to portion sizes, drink less juice and soda pop, and eat more fruits and vegetables. ¨ Eat a healthy amount of food. A 3-ounce serving of meat, for example, is about the size of a deck of cards. Fill the rest of your plate with vegetables and whole grains. ¨ Limit the amount of soda and sports drinks you have every day. Drink more water when you are thirsty. ¨ Eat at least 5 servings of fruits and vegetables every day. It may seem like a lot, but it is not hard to reach this goal. A serving or helping is 1 piece of fruit, 1 cup of vegetables, or 2 cups of leafy, raw vegetables. Have an apple or some carrot sticks as an afternoon snack instead of a candy bar. Try to have fruits and/or vegetables at every meal. 
· Make exercise part of your daily routine. You may want to start with simple activities, such as walking, bicycling, or slow swimming. Try to be active 30 to 60 minutes every day. You do not need to do all 30 to 60 minutes all at once. For example, you can exercise 3 times a day for 10 or 20 minutes. Moderate exercise is safe for most people, but it is always a good idea to talk to your doctor before starting an exercise program. 
· Keep moving. Ronda Mccallor the lawn, work in the garden, or Months Of Me. Take the stairs instead of the elevator at work. · If you smoke, quit. People who smoke have an increased risk for heart attack, stroke, cancer, and other lung illnesses. Quitting is hard, but there are ways to boost your chance of quitting tobacco for good. ¨ Use nicotine gum, patches, or lozenges. ¨ Ask your doctor about stop-smoking programs and medicines. ¨ Keep trying. In addition to reducing your risk of diseases in the future, you will notice some benefits soon after you stop using tobacco. If you have shortness of breath or asthma symptoms, they will likely get better within a few weeks after you quit. · Limit how much alcohol you drink. Moderate amounts of alcohol (up to 2 drinks a day for men, 1 drink a day for women) are okay.  But drinking too much can lead to liver problems, high blood pressure, and other health problems. Family health If you have a family, there are many things you can do together to improve your health. · Eat meals together as a family as often as possible. · Eat healthy foods. This includes fruits, vegetables, lean meats and dairy, and whole grains. · Include your family in your fitness plan. Most people think of activities such as jogging or tennis as the way to fitness, but there are many ways you and your family can be more active. Anything that makes you breathe hard and gets your heart pumping is exercise. Here are some tips: 
¨ Walk to do errands or to take your child to school or the bus. ¨ Go for a family bike ride after dinner instead of watching TV. Where can you learn more? Go to http://rashawn-sybil.info/. Enter S172 in the search box to learn more about \"A Healthy Lifestyle: Care Instructions. \" Current as of: May 12, 2017 Content Version: 11.4 © 0711-6579 [employer]. Care instructions adapted under license by [employer] (which disclaims liability or warranty for this information). If you have questions about a medical condition or this instruction, always ask your healthcare professional. [place] [address] any warranty or liability for your use of this information. Introducing [facility]! Dear Mary Grover: Thank you for requesting a [employer] account. Our records indicate that you have previously registered for a [employer] account but its currently inactive. Please call our [employer] support line at [phone]. Additional Information If you have questions, please visit the Frequently Asked Questions section of the [employer] website at [email or web address] Remember, [employer] is NOT to be used for urgent needs. For medical emergencies, dial 911. Now available from your iPhone and Android! Please provide this summary of care documentation to your next provider. Your primary care clinician is listed as NONE. If you have any questions after today's visit, please call [phone].

## 2018-05-24 DIAGNOSIS — G43.109 VERTIGINOUS MIGRAINE: ICD-10-CM

## 2018-05-25 RX ORDER — RIZATRIPTAN BENZOATE 10 MG/1
TABLET ORAL
Qty: 9 TAB | Refills: 0 | Status: SHIPPED | OUTPATIENT
Start: 2018-05-25 | End: 2018-07-07 | Stop reason: SDUPTHER

## 2018-06-11 DIAGNOSIS — G43.109 VERTIGINOUS MIGRAINE: ICD-10-CM

## 2018-06-11 RX ORDER — NORTRIPTYLINE HYDROCHLORIDE 50 MG/1
CAPSULE ORAL
Qty: 60 CAP | Refills: 3 | Status: SHIPPED | OUTPATIENT
Start: 2018-06-11 | End: 2018-10-09 | Stop reason: SDUPTHER

## 2018-07-07 DIAGNOSIS — G43.109 VERTIGINOUS MIGRAINE: ICD-10-CM

## 2018-07-09 RX ORDER — RIZATRIPTAN BENZOATE 10 MG/1
TABLET ORAL
Qty: 9 TAB | Refills: 0 | Status: SHIPPED | OUTPATIENT
Start: 2018-07-09 | End: 2018-10-17 | Stop reason: SDUPTHER

## 2018-07-16 ENCOUNTER — OFFICE VISIT (OUTPATIENT)
Dept: NEUROLOGY | Age: 38
End: 2018-07-16

## 2018-07-16 VITALS
BODY MASS INDEX: 30.66 KG/M2 | RESPIRATION RATE: 20 BRPM | WEIGHT: 207 LBS | OXYGEN SATURATION: 98 % | HEART RATE: 113 BPM | HEIGHT: 69 IN | SYSTOLIC BLOOD PRESSURE: 150 MMHG | DIASTOLIC BLOOD PRESSURE: 86 MMHG

## 2018-07-16 DIAGNOSIS — I10 ESSENTIAL HYPERTENSION: ICD-10-CM

## 2018-07-16 DIAGNOSIS — G43.109 MIGRAINE WITH AURA AND WITHOUT STATUS MIGRAINOSUS, NOT INTRACTABLE: Primary | ICD-10-CM

## 2018-07-16 DIAGNOSIS — R00.0 SINUS TACHYCARDIA: ICD-10-CM

## 2018-07-16 RX ORDER — PROPRANOLOL HYDROCHLORIDE 80 MG/1
80 CAPSULE, EXTENDED RELEASE ORAL DAILY
Qty: 30 CAP | Refills: 3 | Status: SHIPPED | OUTPATIENT
Start: 2018-07-16 | End: 2018-11-12 | Stop reason: SDUPTHER

## 2018-07-16 NOTE — PROGRESS NOTES
Migraines- they have been more frequent than normal, hasn't been a very good time period   1-2 a week, lasting maybe 2 hours after taking the Merrianne Earing is doing a great job catching them, none that have really been debilitating

## 2018-07-16 NOTE — PROGRESS NOTES
Date:  18     Name:  Cheng Aburto  :  1980  MRN:  [id number]     PCP:  None    Chief Complaint   Patient presents with    Migraine     HISTORY OF PRESENT ILLNESS: Follow up for migraines. These have been worse since her last visit over the last 8 weeks. She indicates that she has been getting 1-2/week rather per month. When she gets them, the Maxalt works wonderfully. She has not had any debilitating migraines. She has been under a greater amount of stress lately. Her daughter sustained a compound fracture in her leg while riding her Razor. Her  is finally addressing his depression issues. She has not been monitoring her blood pressure and has not followed up with her PCP. Except as noted above, denies  fever, chills, cough. No CP or SOB. No dysuria, loss of bowel or bladder control. No Weight loss. Appetite good. Sleeping well. No sweats. No edema. No bruising or bleeding. No nausea or vomit. No diarrhea. No frequency, urgency, No depressive sxs. No anxiety. Denies sore throat, nasal congestion, nasal discharge, epistaxis, tinnitus, hearing loss, back pain, muscle pain, or joint pain. Current Outpatient Prescriptions   Medication Sig    rizatriptan (MAXALT) 10 mg tablet TAKE 1 TAB BY MOUTH ONCE AS NEEDED FOR MIGRAINE FOR UP TO 1 DOSE. MAY REPEAT IN 2 HOURS IF NEEDED    nortriptyline (PAMELOR) 50 mg capsule TAKE 2 CAPS BY MOUTH NIGHTLY.  melatonin 1 mg tablet Take  by mouth nightly.  gabapentin (NEURONTIN) 300 mg capsule Take 300 mg by mouth three (3) times daily.  ibuprofen (MOTRIN) 400 mg tablet Take 400 mg by mouth two (2) times daily as needed. No current facility-administered medications for this visit.       Allergies   Allergen Reactions    Codeine Nausea and Vomiting    Demerol [Meperidine] Nausea and Vomiting    Keflex [Cephalexin] Itching    Pcn [Penicillins] Hives     Past Medical History:   Diagnosis Date    Anxiety     Arrhythmia     Sinus Tach    Arthritis     Chronic pain     Fatigue     Fibromyalgia     DAVEY (generalized anxiety disorder)     Headache     Hypertension     Muscle pain     Nausea & vomiting     Other ill-defined conditions(799.89)     fibromyalgia    Other ill-defined conditions(799.89)     hypoglycemia    Snoring     Unspecified adverse effect of anesthesia     pain at IV site and pain through the vein with anesthesia    Vertigo     Visual disturbance      Past Surgical History:   Procedure Laterality Date    HX HEENT  5-2012    wisdom teeth    HX ORTHOPAEDIC  1993    right knee    HX ORTHOPAEDIC  2001    right ankle     Social History     Social History    Marital status:      Spouse name: N/A    Number of children: N/A    Years of education: N/A     Occupational History    Not on file. Social History Main Topics    Smoking status: Former Smoker     Packs/day: 0.50     Years: 6.00     Quit date: 10/15/2005    Smokeless tobacco: Never Used    Alcohol use 1.0 oz/week     2 Glasses of wine per week      Comment: 1-4/week    Drug use: No    Sexual activity: Not on file     Other Topics Concern    Not on file     Social History Narrative     Family History   Problem Relation Age of Onset    Post-op Nausea/Vomiting Mother     Cancer Mother     Heart Disease Father     Cancer Maternal Uncle     Lupus Paternal Uncle     Dementia Maternal Grandmother     Heart Disease Maternal Grandmother     Stroke Maternal Grandmother     Cancer Other        PHYSICAL EXAMINATION:    Visit Vitals    /86    Pulse (!) 113    Resp 20    Ht 5' 9\" (1.753 m)    Wt 93.9 kg (207 lb)    SpO2 98%    BMI 30.57 kg/m2     General: Well defined, nourished, and groomed individual in no acute distress. Neck: Supple, nontender, no bruits, no pain with resistance to active range of motion. Heart: Regular rate and rhythm, no murmurs, rub, or gallop. Normal S1S2.   Lungs: Clear to auscultation bilaterally with equal chest expansion, no cough, no wheeze  Musculoskeletal: Extremities revealed no edema and had full range of motion of joints. Psych: Good mood and bright affect      NEUROLOGICAL EXAMINATION:   Mental Status: Alert and oriented to person, place, and time       Cranial Nerves:   II, III, IV, VI: Visual acuity grossly intact. Visual fields are normal.   Pupils are equal, round, and reactive to light and accommodation. Extra-ocular movements are full and fluid. Fundoscopic exam was benign, no ptosis or nystagmus. V-XII: Hearing is grossly intact. Facial features are symmetric, with normal sensation and strength. The palate rises symmetrically and the tongue protrudes midline. Sternocleidomastoids 5/5.       Motor Examination: Normal tone, bulk, and strength, 5/5 muscle strength throughout.      Coordination: Finger to nose was normal. No resting or intention tremor      Gait and Station: Steady while walking. Normal arm swing. No Rhomberg or pronator drift. No muscle wasting or fasiculations noted.      Reflexes: DTRs 2+ throughout.       ASSESSMENT AND PLAN    ICD-10-CM ICD-9-CM    1. Migraine with aura and without status migrainosus, not intractable G43.109 346.00 propranolol LA (INDERAL LA) 80 mg SR capsule   2. Essential hypertension I10 401.9 propranolol LA (INDERAL LA) 80 mg SR capsule   3. Sinus tachycardia R00.0 427.89 propranolol LA (INDERAL LA) 80 mg SR capsule     Discussed migraine prevention and treatment of her blood pressure which is still elevated at today. Will start Inderal LA 80mg daily. Purpose and potential side effects were discussed and she has verbalized understanding. Follow up in 4 weeks. Jena Diaz

## 2018-07-16 NOTE — MR AVS SNAPSHOT
[address] [facility] [address] [facility] [address] [facility] [id number] [phone] Patient: Laury Oakley MRN: [id number] :1980 Visit Information Date & Time Provider Department Dept. Phone Encounter #  
 2018 11:30 AM KOREY Arnold [facility] [phone] [id number] Upcoming Health Maintenance Date Due DTaP/Tdap/Td series (1 - Tdap) 2000 PAP AKA CERVICAL CYTOLOGY 2000 Influenza Age 5 to Adult 2018 Allergies as of 2018  Review Complete On: 2018 By: Justus Ritter NP Severity Noted Reaction Type Reactions Codeine  10/15/2012    Nausea and Vomiting Demerol [Meperidine]  10/16/2012    Nausea and Vomiting Keflex [Cephalexin]  10/16/2012    Itching Pcn [Penicillins]  10/15/2012    Hives Current Immunizations  Never Reviewed No immunizations on file. Not reviewed this visit You Were Diagnosed With   
  
 Codes Comments Migraine with aura and without status migrainosus, not intractable    -  Primary ICD-10-CM: G43.109 ICD-9-CM: 346.00 Essential hypertension     ICD-10-CM: I10 
ICD-9-CM: 401.9 Sinus tachycardia     ICD-10-CM: R00.0 ICD-9-CM: 427.89 Vitals BP Pulse Resp Height(growth percentile) Weight(growth percentile) SpO2  
 150/86 (!) 113 20 5' 9\" (1.753 m) 207 lb (93.9 kg) 98% BMI OB Status Smoking Status 30.57 kg/m2 Having regular periods Former Smoker Vitals History BMI and BSA Data Body Mass Index Body Surface Area 30.57 kg/m 2 2.14 m 2 Preferred Pharmacy Pharmacy Name Phone [phone], [address] [address] [place] [phone] Your Updated Medication List  
  
   
This list is accurate as of 18 11:56 AM.  Always use your most recent med list.  
  
  
  
  
 gabapentin 300 mg capsule Commonly known as:  NEURONTIN  
 Take 300 mg by mouth three (3) times daily. ibuprofen 400 mg tablet Commonly known as:  MOTRIN Take 400 mg by mouth two (2) times daily as needed. melatonin 1 mg tablet Take  by mouth nightly. nortriptyline 50 mg capsule Commonly known as:  PAMELOR  
TAKE 2 CAPS BY MOUTH NIGHTLY. propranolol LA 80 mg SR capsule Commonly known as:  INDERAL LA Take 1 Cap by mouth daily. rizatriptan 10 mg tablet Commonly known as:  Mert Dwayne TAKE 1 TAB BY MOUTH ONCE AS NEEDED FOR MIGRAINE FOR UP TO 1 DOSE. MAY REPEAT IN 2 HOURS IF NEEDED Prescriptions Sent to Pharmacy Refills  
 propranolol LA (INDERAL LA) 80 mg SR capsule 3 Sig: Take 1 Cap by mouth daily. Class: Normal  
 [facility] [address] [place] [address] #: [phone] Route: Oral  
  
Patient Instructions A Healthy Lifestyle: Care Instructions Your Care Instructions A healthy lifestyle can help you feel good, stay at a healthy weight, and have plenty of energy for both work and play. A healthy lifestyle is something you can share with your whole family. A healthy lifestyle also can lower your risk for serious health problems, such as high blood pressure, heart disease, and diabetes. You can follow a few steps listed below to improve your health and the health of your family. Follow-up care is a key part of your treatment and safety. Be sure to make and go to all appointments, and call your doctor if you are having problems. It's also a good idea to know your test results and keep a list of the medicines you take. How can you care for yourself at home? · Do not eat too much sugar, fat, or fast foods. You can still have dessert and treats now and then. The goal is moderation. · Start small to improve your eating habits. Pay attention to portion sizes, drink less juice and soda pop, and eat more fruits and vegetables. ¨ Eat a healthy amount of food. A 3-ounce serving of meat, for example, is about the size of a deck of cards. Fill the rest of your plate with vegetables and whole grains. ¨ Limit the amount of soda and sports drinks you have every day. Drink more water when you are thirsty. ¨ Eat at least 5 servings of fruits and vegetables every day. It may seem like a lot, but it is not hard to reach this goal. A serving or helping is 1 piece of fruit, 1 cup of vegetables, or 2 cups of leafy, raw vegetables. Have an apple or some carrot sticks as an afternoon snack instead of a candy bar. Try to have fruits and/or vegetables at every meal. 
· Make exercise part of your daily routine. You may want to start with simple activities, such as walking, bicycling, or slow swimming. Try to be active 30 to 60 minutes every day. You do not need to do all 30 to 60 minutes all at once. For example, you can exercise 3 times a day for 10 or 20 minutes. Moderate exercise is safe for most people, but it is always a good idea to talk to your doctor before starting an exercise program. 
· Keep moving. Alta Limb the lawn, work in the garden, or CamioCam. Take the stairs instead of the elevator at work. · If you smoke, quit. People who smoke have an increased risk for heart attack, stroke, cancer, and other lung illnesses. Quitting is hard, but there are ways to boost your chance of quitting tobacco for good. ¨ Use nicotine gum, patches, or lozenges. ¨ Ask your doctor about stop-smoking programs and medicines. ¨ Keep trying. In addition to reducing your risk of diseases in the future, you will notice some benefits soon after you stop using tobacco. If you have shortness of breath or asthma symptoms, they will likely get better within a few weeks after you quit. · Limit how much alcohol you drink. Moderate amounts of alcohol (up to 2 drinks a day for men, 1 drink a day for women) are okay.  But drinking too much can lead to liver problems, high blood pressure, and other health problems. Family health If you have a family, there are many things you can do together to improve your health. · Eat meals together as a family as often as possible. · Eat healthy foods. This includes fruits, vegetables, lean meats and dairy, and whole grains. · Include your family in your fitness plan. Most people think of activities such as jogging or tennis as the way to fitness, but there are many ways you and your family can be more active. Anything that makes you breathe hard and gets your heart pumping is exercise. Here are some tips: 
¨ Walk to do errands or to take your child to school or the bus. ¨ Go for a family bike ride after dinner instead of watching TV. Where can you learn more? Go to http://rashawn-sybil.info/. Enter B916 in the search box to learn more about \"A Healthy Lifestyle: Care Instructions. \" Current as of: December 7, 2017 Content Version: 11.7 © 7285-2287 RichRelevance. Care instructions adapted under license by [employer] (which disclaims liability or warranty for this information). If you have questions about a medical condition or this instruction, always ask your healthcare professional. [place] [address] any warranty or liability for your use of this information. Introducing [facility]! Dear Vincent Hodges: Thank you for requesting a Vyopta account. Our records indicate that you have previously registered for a Vyopta account but its currently inactive. Please call our Vyopta support line at [phone]. Additional Information If you have questions, please visit the Frequently Asked Questions section of the Vyopta website at [email or web address] Remember, Vyopta is NOT to be used for urgent needs. For medical emergencies, dial 911. Now available from your iPhone and Android! Please provide this summary of care documentation to your next provider. Your primary care clinician is listed as NONE. If you have any questions after today's visit, please call [phone].

## 2018-07-16 NOTE — PATIENT INSTRUCTIONS

## 2018-10-09 DIAGNOSIS — G43.109 VERTIGINOUS MIGRAINE: ICD-10-CM

## 2018-10-10 RX ORDER — NORTRIPTYLINE HYDROCHLORIDE 50 MG/1
CAPSULE ORAL
Qty: 60 CAP | Refills: 3 | Status: SHIPPED | OUTPATIENT
Start: 2018-10-10 | End: 2019-02-25 | Stop reason: SDUPTHER

## 2018-10-17 DIAGNOSIS — G43.109 VERTIGINOUS MIGRAINE: ICD-10-CM

## 2018-10-17 RX ORDER — RIZATRIPTAN BENZOATE 10 MG/1
TABLET ORAL
Qty: 9 TAB | Refills: 0 | Status: SHIPPED | OUTPATIENT
Start: 2018-10-17 | End: 2018-12-02 | Stop reason: SDUPTHER

## 2018-11-12 DIAGNOSIS — R00.0 SINUS TACHYCARDIA: ICD-10-CM

## 2018-11-12 DIAGNOSIS — G43.109 MIGRAINE WITH AURA AND WITHOUT STATUS MIGRAINOSUS, NOT INTRACTABLE: ICD-10-CM

## 2018-11-12 DIAGNOSIS — I10 ESSENTIAL HYPERTENSION: ICD-10-CM

## 2018-11-12 RX ORDER — PROPRANOLOL HYDROCHLORIDE 80 MG/1
CAPSULE, EXTENDED RELEASE ORAL
Qty: 30 CAP | Refills: 3 | Status: SHIPPED | OUTPATIENT
Start: 2018-11-12 | End: 2019-03-28 | Stop reason: ALTCHOICE

## 2018-12-02 DIAGNOSIS — G43.109 VERTIGINOUS MIGRAINE: ICD-10-CM

## 2018-12-03 RX ORDER — RIZATRIPTAN BENZOATE 10 MG/1
TABLET ORAL
Qty: 9 TAB | Refills: 0 | Status: SHIPPED | OUTPATIENT
Start: 2018-12-03 | End: 2019-06-07 | Stop reason: SDUPTHER

## 2019-01-28 ENCOUNTER — OFFICE VISIT (OUTPATIENT)
Dept: NEUROLOGY | Age: 39
End: 2019-01-28

## 2019-01-28 VITALS
DIASTOLIC BLOOD PRESSURE: 92 MMHG | BODY MASS INDEX: 30.57 KG/M2 | OXYGEN SATURATION: 99 % | RESPIRATION RATE: 20 BRPM | HEART RATE: 108 BPM | HEIGHT: 69 IN | SYSTOLIC BLOOD PRESSURE: 160 MMHG

## 2019-01-28 DIAGNOSIS — G43.009 MIGRAINE WITHOUT AURA AND WITHOUT STATUS MIGRAINOSUS, NOT INTRACTABLE: Primary | ICD-10-CM

## 2019-01-28 DIAGNOSIS — I10 ESSENTIAL HYPERTENSION: ICD-10-CM

## 2019-01-28 RX ORDER — LISINOPRIL 10 MG/1
10 TABLET ORAL DAILY
Qty: 30 TAB | Refills: 3 | Status: SHIPPED | OUTPATIENT
Start: 2019-01-28 | End: 2019-03-28 | Stop reason: SDUPTHER

## 2019-01-28 NOTE — PROGRESS NOTES
Date:  19     Name:  Janki Garcia  :  1980  MRN:  [id number]     PCP:  None    Chief Complaint   Patient presents with    Headache     HISTORY OF PRESENT ILLNESS: Follow up for migraines. At her last office visit, she was started on Inderal LA 80mg daily for management of her headaches as well as her blood pressure. She indicates that this seemed to help but she has noticed that it makes her tired. She ran out of this about five days ago. Lately, she indicates that she has a headache about 4-6 times per month with each migraine lasting about 2-3 days per episode. Except as noted above, denies  fever, chills, cough. No CP or SOB. No dysuria, loss of bowel or bladder control. No Weight loss. Appetite good. Sleeping well. No sweats. No edema. No bruising or bleeding. No nausea or vomit. No diarrhea. No frequency, urgency, No depressive sxs. No anxiety. Denies sore throat, nasal congestion, nasal discharge, epistaxis, tinnitus, hearing loss, back pain, muscle pain, or joint pain. Current Outpatient Medications   Medication Sig    rizatriptan (MAXALT) 10 mg tablet TAKE 1 TAB BY MOUTH ONCE AS NEEDED FOR MIGRAINE FOR UP TO 1 DOSE. MAY REPEAT IN 2 HOURS IF NEEDED    propranolol LA (INDERAL LA) 80 mg SR capsule TAKE 1 CAPSULE BY MOUTH EVERY DAY    nortriptyline (PAMELOR) 50 mg capsule TAKE 2 CAPS BY MOUTH NIGHTLY.  melatonin 1 mg tablet Take  by mouth nightly.  gabapentin (NEURONTIN) 300 mg capsule Take 300 mg by mouth three (3) times daily.  ibuprofen (MOTRIN) 400 mg tablet Take 400 mg by mouth two (2) times daily as needed.      Also has Mirena    Allergies   Allergen Reactions    Codeine Nausea and Vomiting    Demerol [Meperidine] Nausea and Vomiting    Keflex [Cephalexin] Itching    Pcn [Penicillins] Hives     Past Medical History:   Diagnosis Date    Anxiety     Arrhythmia     Sinus Tach    Arthritis     Chronic pain     Fatigue     Fibromyalgia  DAVEY (generalized anxiety disorder)     Headache     Hypertension     Muscle pain     Nausea & vomiting     Other ill-defined conditions(799.89)     fibromyalgia    Other ill-defined conditions(799.89)     hypoglycemia    Snoring     Unspecified adverse effect of anesthesia     pain at IV site and pain through the vein with anesthesia    Vertigo     Visual disturbance      Past Surgical History:   Procedure Laterality Date    HX HEENT  -    wisdom teeth    HX ORTHOPAEDIC      right knee    HX ORTHOPAEDIC      right ankle     Social History     Socioeconomic History    Marital status:      Spouse name: Not on file    Number of children: Not on file    Years of education: Not on file    Highest education level: Not on file   Social Needs    Financial resource strain: Not on file    Food insecurity - worry: Not on file    Food insecurity - inability: Not on file   [employer] needs - medical: Not on file   [employer] needs - non-medical: Not on file   Occupational History    Not on file   Tobacco Use    Smoking status: Former Smoker     Packs/day: 0.50     Years: 6.00     Pack years: 3.00     Last attempt to quit: 10/15/2005     Years since quittin.2    Smokeless tobacco: Never Used   Substance and Sexual Activity    Alcohol use:  Yes     Alcohol/week: 1.0 oz     Types: 2 Glasses of wine per week     Comment: 1-4/week    Drug use: No    Sexual activity: Not on file   Other Topics Concern    Not on file   Social History Narrative    Not on file     Family History   Problem Relation Age of Onset    Post-op Nausea/Vomiting Mother     Cancer Mother     Heart Disease Father     Cancer Maternal Uncle     Lupus Paternal Uncle     Dementia Maternal Grandmother     Heart Disease Maternal Grandmother     Stroke Maternal Grandmother     Cancer Other        PHYSICAL EXAMINATION:    Visit Vitals  BP (!) 160/92   Pulse (!) 108   Resp 20   Ht 5' 9\" (1.753 m) SpO2 99%   BMI 30.57 kg/m²     General: Well defined, nourished, and groomed individual in no acute distress. Neck: Supple, nontender, no bruits, no pain with resistance to active range of motion. Heart: Regular rate and rhythm, no murmurs, rub, or gallop. Normal S1S2. Lungs: Clear to auscultation bilaterally with equal chest expansion, no cough, no wheeze  Musculoskeletal: Extremities revealed no edema and had full range of motion of joints. Psych: Good mood and bright affect      NEUROLOGICAL EXAMINATION:   Mental Status: Alert and oriented to person, place, and time       Cranial Nerves:   II, III, IV, VI: Visual acuity grossly intact. Visual fields are normal.   Pupils are equal, round, and reactive to light and accommodation. Extra-ocular movements are full and fluid. Fundoscopic exam was benign, no ptosis or nystagmus. V-XII: Hearing is grossly intact. Facial features are symmetric, with normal sensation and strength. The palate rises symmetrically and the tongue protrudes midline. Sternocleidomastoids 5/5.       Motor Examination: Normal tone, bulk, and strength, 5/5 muscle strength throughout.      Coordination: Finger to nose was normal. No resting or intention tremor      Gait and Station: Steady while walking. Normal arm swing. No Rhomberg or pronator drift. No muscle wasting or fasiculations noted.      Reflexes: DTRs 2+ throughout.       ASSESSMENT AND PLAN    ICD-10-CM ICD-9-CM    1. Migraine without aura and without status migrainosus, not intractable G43.009 346.10 fremanezumab-vfrm (AJOVY) 225 mg/1.5 mL syrg      REFERRAL TO PHYSICAL THERAPY   2. Essential hypertension I10 401.9 lisinopril (PRINIVIL, ZESTRIL) 10 mg tablet     Her morning blood pressure as taken by her  was a little high and it is much higher than reported in the office. Since the Inderal LA seemed to cause sedation, she was given Lisinopril specifically for the hypertension.  We discussed the benefits of weight management and exercise with regard to blood pressure management. For the migraines, She was provided additional education on migraine today particularly in the non-pharmacologic and pharmacologic treatment. She was encouraged to continue with her plans for weight management as well as exercise as both can help in terms of the headaches as well as the hypertension. Discussed potential treatment options to include the new CGRP inhibitors, Ajovy and Aimovig, to include the purpose, potential side effects, storage and administration. She was started on Ajovy today. Follow up in about 8 weeks     [facility].  Amada Vega

## 2019-01-28 NOTE — PROGRESS NOTES
Migraines- about the same or a little bit worse   4-6 a month, lasting 2-3 days on a minor level even with the prn medication   Most of them she can catch with the sumatriptan to dull the pain but they still linger     She did run out of her propranolol about 5 days ago so she hasn't had it for that long

## 2019-01-28 NOTE — PATIENT INSTRUCTIONS
A Healthy Lifestyle: Care Instructions  Your Care Instructions    A healthy lifestyle can help you feel good, stay at a healthy weight, and have plenty of energy for both work and play. A healthy lifestyle is something you can share with your whole family. A healthy lifestyle also can lower your risk for serious health problems, such as high blood pressure, heart disease, and diabetes. You can follow a few steps listed below to improve your health and the health of your family. Follow-up care is a key part of your treatment and safety. Be sure to make and go to all appointments, and call your doctor if you are having problems. It's also a good idea to know your test results and keep a list of the medicines you take. How can you care for yourself at home? · Do not eat too much sugar, fat, or fast foods. You can still have dessert and treats now and then. The goal is moderation. · Start small to improve your eating habits. Pay attention to portion sizes, drink less juice and soda pop, and eat more fruits and vegetables. ? Eat a healthy amount of food. A 3-ounce serving of meat, for example, is about the size of a deck of cards. Fill the rest of your plate with vegetables and whole grains. ? Limit the amount of soda and sports drinks you have every day. Drink more water when you are thirsty. ? Eat at least 5 servings of fruits and vegetables every day. It may seem like a lot, but it is not hard to reach this goal. A serving or helping is 1 piece of fruit, 1 cup of vegetables, or 2 cups of leafy, raw vegetables. Have an apple or some carrot sticks as an afternoon snack instead of a candy bar. Try to have fruits and/or vegetables at every meal.  · Make exercise part of your daily routine. You may want to start with simple activities, such as walking, bicycling, or slow swimming. Try to be active 30 to 60 minutes every day. You do not need to do all 30 to 60 minutes all at once.  For example, you can exercise 3 times a day for 10 or 20 minutes. Moderate exercise is safe for most people, but it is always a good idea to talk to your doctor before starting an exercise program.  · Keep moving. Musa Rather the lawn, work in the garden, or SaleHoot. Take the stairs instead of the elevator at work. · If you smoke, quit. People who smoke have an increased risk for heart attack, stroke, cancer, and other lung illnesses. Quitting is hard, but there are ways to boost your chance of quitting tobacco for good. ? Use nicotine gum, patches, or lozenges. ? Ask your doctor about stop-smoking programs and medicines. ? Keep trying. In addition to reducing your risk of diseases in the future, you will notice some benefits soon after you stop using tobacco. If you have shortness of breath or asthma symptoms, they will likely get better within a few weeks after you quit. · Limit how much alcohol you drink. Moderate amounts of alcohol (up to 2 drinks a day for men, 1 drink a day for women) are okay. But drinking too much can lead to liver problems, high blood pressure, and other health problems. Family health  If you have a family, there are many things you can do together to improve your health. · Eat meals together as a family as often as possible. · Eat healthy foods. This includes fruits, vegetables, lean meats and dairy, and whole grains. · Include your family in your fitness plan. Most people think of activities such as jogging or tennis as the way to fitness, but there are many ways you and your family can be more active. Anything that makes you breathe hard and gets your heart pumping is exercise. Here are some tips:  ? Walk to do errands or to take your child to school or the bus.  ? Go for a family bike ride after dinner instead of watching TV. Where can you learn more? Go to http://rashawn-sybil.info/. Enter T405 in the search box to learn more about \"A Healthy Lifestyle: Care Instructions. \"  Current as of: September 11, 2018  Content Version: 11.9  © 8546-8935 [employer]. Care instructions adapted under license by [employer]Cloud (which disclaims liability or warranty for this information). If you have questions about a medical condition or this instruction, always ask your healthcare professional. [address] any warranty or liability for your use of this information.

## 2019-02-25 DIAGNOSIS — G43.109 VERTIGINOUS MIGRAINE: ICD-10-CM

## 2019-02-25 RX ORDER — NORTRIPTYLINE HYDROCHLORIDE 50 MG/1
CAPSULE ORAL
Qty: 60 CAP | Refills: 3 | Status: SHIPPED | OUTPATIENT
Start: 2019-02-25 | End: 2019-06-19 | Stop reason: SDUPTHER

## 2019-02-26 ENCOUNTER — HOSPITAL ENCOUNTER (OUTPATIENT)
Dept: PHYSICAL THERAPY | Age: 39
Discharge: HOME | End: 2019-02-26
Payer: COMMERCIAL

## 2019-02-26 PROCEDURE — 97161 PT EVAL LOW COMPLEX 20 MIN: CPT | Performed by: PHYSICAL THERAPIST

## 2019-02-26 NOTE — PROGRESS NOTES
PT INITIAL EVALUATION NOTE - [facility] 2-15 Patient Name: Maikel Tavarez Date:2019 : 1980 [x]  Patient  Verified Payor: BLUE CROSS / Plan: [address] [place] [address] / Product Type: PPO / In time:  Out time:111 Total Treatment Time (min): 60 Total Timed Codes (min): -- 
1:1 Treatment Time ( [address] only): -- Visit #: 1 Treatment Area: Concussion [S06.0X9A] SUBJECTIVE Pain Level (0-10 scale): 4.5/10 Any medication changes, allergies to medications, adverse drug reactions, diagnosis change, or new procedure performed?: [] No    [x] Yes (see summary sheet for update) Subjective:  she began to experience a fibromyalgia \"flare up\" with heart palpitations and general body pain. She has had success with gabapentin to reduce pain so she can work. Summer 2013 (mid pregnancy) she woke up with feeling nausea and began vomiting and then she felt like she could not get up off the floor due to the intensity dizziness and she could not get herself into an upright position. She was [facility] at [facility] for 4 days then went home then came back for 5 days. She was cleared from Brain/Brain stem/vascular injury. Diagnosed with vestibular neuritis. She was being treated with vestibular therapy and she was vomiting each session with the exercises so she stopped at approx 33 month of her pregnancy. She is now having reproduction of migraines frequency increases from 3-5 to approx 7 a month. Migraines are reproduced with bright lights, loud noises, reading Light and sound triggers migraines and dizziness is a component of migraine She is noticing movement while sitting still. She is able to move her head into cervical rotation L/R while talking without reporting increased dizziness. Hopes to find some sort of exercises or massage that may help improve her symptoms and wants to stop her medication Easily motion sickness, and ear infections OBJECTIVE 
 Posture: Forward head posture Other Observations:  -- 
Gait and Functional Mobility:  Head lag noted with supine to sit transfer Palpation: tenderness to palpation of bilateral cervical paraspinal and suboccipital region as well as upper trap and levator scapulae muscles. Cervical AROM:   
    R  L Flexion    70 P!  -- Extension   40  --   
Side Bending   45 P! R 40 P! R   
Rotation   60 P! R 78 P! R 
 
Right shoulder AROM Flex 165, IR hand to L5, ER hand to T3  
  PROM Flex 165 P!, IR 70 P!, ER 85 P!  
 
 
 
 
UPPER QUARTER   MUSCLE STRENGTH 
KEY       R  L 
0 - No Contraction  C1, C2 Neck Flex +4  +4 
1 - Trace   C3 Side Flex  +4  +4 
2 - Poor   C4 Sh Elev  +4  5 
3 - Fair    C5 Deltoid/Biceps 5  5 
4 - Good   C6 Wrist Ext  5  5 
5 - Normal   C7 Triceps  5  5 C8 Thumb Ext  5  5 
    T1 Hand Inst  5  5 Flexibility: upper trap, levator scapulae and suboccipital region stiffness Mobility Assessment: midcervical upslide and downslide hypomotility C4/5 -C7/T1 and O-A A-P hypomobility MMT: Right shoulder Flex, ABD and ER +4/5 P! Neurological: Reflexes / Sensations: normal 
Special Tests: Cervical Distraction: reduce pain  Cervical Compression: increase pain Tiarra Casillas: Positive    Neer Impingement: positive With 
 [] TE 
 [] TA 
 [] neuro 
 [] other: Patient Education: [] Review HEP [] Progressed/Changed HEP based on:  
[] positioning   [] body mechanics   [] transfers   [] heat/ice application   
[] other:   
 
Other Objective/Functional Measures: -- 
 
Pain Level (0-10 scale) post treatment: 5/10 ASSESSMENT/Changes in Function:  
 
[x]  See Plan of Care Liliana Isaacs, PT, DPT 2/26/2019

## 2019-03-01 ENCOUNTER — HOSPITAL ENCOUNTER (OUTPATIENT)
Dept: PHYSICAL THERAPY | Age: 39
Discharge: HOME | End: 2019-03-01
Payer: COMMERCIAL

## 2019-03-01 PROCEDURE — 97112 NEUROMUSCULAR REEDUCATION: CPT | Performed by: PHYSICAL THERAPIST

## 2019-03-01 PROCEDURE — 97140 MANUAL THERAPY 1/> REGIONS: CPT | Performed by: PHYSICAL THERAPIST

## 2019-03-01 NOTE — PROGRESS NOTES
PT DAILY TREATMENT NOTE - [facility] 2-15 Patient Name: Aaron Solomon Date:3/1/2019 : 1980 [x]  Patient  Verified Payor: Archana Shen / Plan: Mauro Askew / Product Type: HMO /   
In [address] Total Treatment Time (min): 60 Total Timed Codes (min): 60 
1:1 Treatment Time ( only): 40 Visit #:  2 Treatment Area: Concussion [S06.0X9A] SUBJECTIVE Pain Level (0-10 scale): 3/10 Any medication changes, allergies to medications, adverse drug reactions, diagnosis change, or new procedure performed?: [x] No    [] Yes (see summary sheet for update) Subjective functional status/changes:   [] No changes reported Pt states that she had onset of migraine yesterday and today she is having some residual headache as a result. OBJECTIVE 15 min Therapeutic Exercise:  [x] See flow sheet :  
Rationale: increase ROM, increase strength, improve coordination, improve balance and increase proprioception to improve the patients ability to maintain cervical postural stability 15 min Neuromuscular Re-education:  []  See flow sheet : cueing and instruction for postural control and sub-occipital control Rationale: increase ROM, increase strength, improve coordination, improve balance and increase proprioception  to improve the patients ability to maintain cervical postural stability 30 min Manual Therapy: STM (light) bilateral upper trap, levator scapulae, cervical paraspinal and suboccipital region, grade 2-3 upslide C4/5-C7/T1, sub-occipital release Rationale: decrease pain, increase ROM and increase tissue extensibility to improve the patients ability to maintain cervical postural stability With 
 [] TE 
 [] TA 
 [] neuro 
 [] other: Patient Education: [x] Review HEP [] Progressed/Changed HEP based on:  
[] positioning   [] body mechanics   [] transfers   [] heat/ice application   
[] other: Other Objective/Functional Measures: Cerrvical AROM Rotation R 55   L 70 Pain Level (0-10 scale) post treatment: 3/10 ASSESSMENT/Changes in Function:  
Pt reported increased jaw and forehead pain with increased pressure at sub-occipital region. Did reduce intensity following sub-occipital release. Began light stretching and scapular strengthening today. Patient will continue to benefit from skilled PT services to modify and progress therapeutic interventions, address functional mobility deficits, address ROM deficits, address strength deficits, analyze and address soft tissue restrictions, analyze and cue movement patterns, analyze and modify body mechanics/ergonomics and assess and modify postural abnormalities to attain remaining goals. []  See Plan of Care 
[]  See progress note/recertification 
[]  See Discharge Summary Progress towards goals / Updated goals: 
Long Term Goals: To be accomplished in 4-6 weeks: 
1) Pt will be able to read without increase of neck pain 2) Pt will be able to look over shoulders while driving without increase of neck pain 3) Pt will demonstrate ability to lift >/= 20 lbs without increase of symptoms 4) Pt will be able to sleep through the night without waking in pain 5) Pt will be able to rise from supine to sitting without head lag or pain. 6) Pt will be able to report </= 3 migraines per month 7) Pt will be independent with HEP 
  
 
PLAN [x]  Upgrade activities as tolerated     [x]  Continue plan of care 
[]  Update interventions per flow sheet      
[]  Discharge due to:_ 
[]  Other:_   
 
Lux Ax, PT, DPT 3/1/2019

## 2019-03-04 ENCOUNTER — HOSPITAL ENCOUNTER (OUTPATIENT)
Dept: PHYSICAL THERAPY | Age: 39
Discharge: HOME | End: 2019-03-04
Payer: COMMERCIAL

## 2019-03-04 PROCEDURE — 97140 MANUAL THERAPY 1/> REGIONS: CPT | Performed by: PHYSICAL THERAPIST

## 2019-03-04 PROCEDURE — 97112 NEUROMUSCULAR REEDUCATION: CPT | Performed by: PHYSICAL THERAPIST

## 2019-03-04 PROCEDURE — 97014 ELECTRIC STIMULATION THERAPY: CPT

## 2019-03-04 PROCEDURE — 97110 THERAPEUTIC EXERCISES: CPT

## 2019-03-04 NOTE — PROGRESS NOTES
PT DAILY TREATMENT NOTE - [facility] 2-15 Patient Name: Autumn Nolen Date:3/4/2019 : 1980 [x]  Patient  Verified Payor: Teo Johnson / Plan: Jacqueline Treviño / Product Type: HMO /   
In time:340p  Out time:520p Total Treatment Time (min): 95 Total Timed Codes (min): 80 
1:1 Treatment Time ([address] only): 80 Visit #:  3 Treatment Area: Concussion [S06.0X9A] SUBJECTIVE Pain Level (0-10 scale): 3/10 Any medication changes, allergies to medications, adverse drug reactions, diagnosis change, or new procedure performed?: [x] No    [] Yes (see summary sheet for update) Subjective functional status/changes:   [] No changes reported Pt reports that she did have soreness after doing the HEP 1/day. She is noticing soreness in neck from sleeping. OBJECTIVE Modality rationale: decrease pain to improve the patients ability to maintain cervical postural control Min Type Additional Details 15 [x] Estim: []Att   [x]Unatt    []TENS instruct []IFC  [x]Premod   []NMES []Other:  []w/US   []w/ice   [x]w/heat Position: supine Location: neck []  Traction: [] Cervical       []Lumbar 
                     [] Prone          []Supine []Intermittent   []Continuous Lbs: 
[] before manual 
[] after manual 
[]w/heat  
 []  Ultrasound: []Continuous   [] Pulsed  
                    at: []1MHz   []3MHz Location: 
W/cm2:  
 [] Paraffin Location:  
[]w/heat  
 []  Ice     []  Heat 
[]  Ice massage Position: Location:  
 []  Laser 
[]  Other: Position: Location:  
 
 []  Vasopneumatic Device Pressure:       [] lo [] med [] hi  
Temperature:   
 
[x] Skin assessment post-treatment:  [x]intact []redness- no adverse reaction 
  []redness  adverse reaction:  
 
30 min Therapeutic Exercise:  [x] See flow sheet :  
Rationale: increase ROM, increase strength, improve coordination, improve balance and increase proprioception to improve the patients ability to maintain cervical postural stability 
  
  
20 min Neuromuscular Re-education:  []  See flow sheet : cueing and instruction for postural control and sub-occipital control Rationale: increase ROM, increase strength, improve coordination, improve balance and increase proprioception  to improve the patients ability to maintain cervical postural stability 
  
30 min Manual Therapy: STM (light) bilateral upper trap, levator scapulae, cervical paraspinal and suboccipital region, grade 2-3 upslide C4/5-C7/T1, sub-occipital release Rationale: decrease pain, increase ROM and increase tissue extensibility to improve the patients ability to maintain cervical postural stability 
  
  
With 
 [] TE 
 [] TA 
 [] neuro 
 [] other: Patient Education: [x] Review HEP [] Progressed/Changed HEP based on:  
[] positioning   [] body mechanics   [] transfers   [] heat/ice application   
[] other:   
  
Other Objective/Functional Measures: Cerrvical AROM Rotation R 57   L 65      
  
Pain Level (0-10 scale) post treatment: 0/10 
  
ASSESSMENT/Changes in Function:  
Again reported head and jaw pain with sub-occipital direct pressure. Kept release pressure less than 75% intensity. Able to improve cervical performance following cuing and instruction. Patient will continue to benefit from skilled PT services to modify and progress therapeutic interventions, address functional mobility deficits, address ROM deficits, address strength deficits, analyze and address soft tissue restrictions, analyze and cue movement patterns, analyze and modify body mechanics/ergonomics and assess and modify postural abnormalities to attain remaining goals. []  See Plan of Care 
[]  See progress note/recertification 
[]  See Discharge Summary Progress towards goals / Updated goals: 
Long Term Goals: To be accomplished in 4-6 weeks: 1) Pt will be able to read without increase of neck pain 2) Pt will be able to look over shoulders while driving without increase of neck pain 3) Pt will demonstrate ability to lift >/= 20 lbs without increase of symptoms 4) Pt will be able to sleep through the night without waking in pain 5) Pt will be able to rise from supine to sitting without head lag or pain. 6) Pt will be able to report </= 3 migraines per month 7) Pt will be independent with HEP 
  
  
PLAN [x]  Upgrade activities as tolerated     [x]  Continue plan of care 
[]  Update interventions per flow sheet      
[]  Discharge due to:_ 
[]  Other:_   
 
 
 
Christian Boland, PT, DPT 3/4/2019

## 2019-03-04 NOTE — PROGRESS NOTES
[employer] Physical Therapy [facility], [address] [facility], [address] [facility] Phone: [phone]  Fax: [phone] Plan of Care/Statement of Necessity for Physical Therapy Services  2-15 Patient name: Beata Medrano  : 1980  Provider#: [id number] Referral source: Salina Combs NP Medical/Treatment Diagnosis: Concussion [S06.0X9A] Prior Hospitalization: see medical history Comorbidities: fibromyalgia, migraines Prior Level of Function: complete 20 minutes of exercise seldom or never Medications: Verified on Patient Summary List 
 
Start of Care: 2019      Onset Date: > 5 yrs ago The Plan of Care and following information is based on the information from the initial evaluation. Assessment/ key information: 35 y.o. Female onset of migraines complicated by sub-occipital and postural strain complicated by fibromyalgia with cervical hypomobility. Evaluation Complexity History MEDIUM  Complexity : 1-2 comorbidities / personal factors will impact the outcome/ POC ; Examination HIGH Complexity : 4+ Standardized tests and measures addressing body structure, function, activity limitation and / or participation in recreation  ;Presentation LOW Complexity : Stable, uncomplicated  ; Overall Complexity Rating: LOW Problem List: pain affecting function, decrease ROM, decrease strength, decrease ADL/ functional abilitiies, decrease activity tolerance and decrease flexibility/ joint mobility Treatment Plan may include any combination of the following: Therapeutic exercise, Therapeutic activities, Neuromuscular re-education, Physical agent/modality, Gait/balance training, Manual therapy, Patient education and Self Care training Patient / Family readiness to learn indicated by: asking questions and trying to perform skills Persons(s) to be included in education: patient (P) Barriers to Learning/Limitations: None Patient Goal (s): reduce number of migraines and reduce fibromyalgia pain Patient Self Reported Health Status: fair Rehabilitation Potential: fair Long Term Goals: To be accomplished in 4-6 weeks: 
1) Pt will be able to read without increase of neck pain 2) Pt will be able to look over shoulders while driving without increase of neck pain 3) Pt will demonstrate ability to lift >/= 20 lbs without increase of symptoms 4) Pt will be able to sleep through the night without waking in pain 5) Pt will be able to rise from supine to sitting without head lag or pain. 6) Pt will be able to report </= 3 migraines per month 7) Pt will be independent with HEP Frequency / Duration: Patient to be seen 1-2 times per week for 3-6 weeks. Patient/ Caregiver education and instruction: activity modification 
 
[x]  Plan of care has been reviewed with DELFINO Holcomb, PT, DPT 3/4/2019  
 
________________________________________________________________________ I certify that the above Therapy Services are being furnished while the patient is under my care. I agree with the treatment plan and certify that this therapy is necessary. [de-identified] Signature:____________________  Date:____________Time: _________

## 2019-03-08 ENCOUNTER — TELEPHONE (OUTPATIENT)
Dept: NEUROLOGY | Age: 39
End: 2019-03-08

## 2019-03-08 ENCOUNTER — HOSPITAL ENCOUNTER (OUTPATIENT)
Dept: PHYSICAL THERAPY | Age: 39
Discharge: HOME | End: 2019-03-08
Payer: COMMERCIAL

## 2019-03-08 PROCEDURE — 97110 THERAPEUTIC EXERCISES: CPT | Performed by: PHYSICAL THERAPIST

## 2019-03-08 PROCEDURE — 97140 MANUAL THERAPY 1/> REGIONS: CPT | Performed by: PHYSICAL THERAPIST

## 2019-03-08 PROCEDURE — 97014 ELECTRIC STIMULATION THERAPY: CPT | Performed by: PHYSICAL THERAPIST

## 2019-03-08 NOTE — PROGRESS NOTES
PT DAILY TREATMENT NOTE - [facility] 2-15    Patient Name: Nancy Schneider  Date:3/8/2019  : 1980  [x]  Patient  Verified  Payor: Mikhail Lopez / Plan: Charito Murillo / Product Type: HMO /    In time:840a  Out time:955a  Total Treatment Time (min): 75  Total Timed Codes (min): 60  1:1 Treatment Time ( only): 60   Visit #:  4    Treatment Area: Concussion [S06.0X9A]    SUBJECTIVE  Pain Level (0-10 scale): 2/10  Any medication changes, allergies to medications, adverse drug reactions, diagnosis change, or new procedure performed?: [x] No    [] Yes (see summary sheet for update)  Subjective functional status/changes:   [] No changes reported  Pt states that she is \"feeling it most between shoulder blades\". She has new ear plugs and they have really helped.       OBJECTIVE    Modality rationale: decrease pain to improve the patients ability to maintain cervical postural control   Min Type Additional Details       15 [x] Estim: []Att   [x]Unatt    []TENS instruct                  []IFC  [x]Premod   []NMES                     []Other:  []w/US   []w/ice   [x]w/heat  Position: supine  Location: neck         []  Traction: [] Cervical       []Lumbar                       [] Prone          []Supine                       []Intermittent   []Continuous Lbs:  [] before manual  [] after manual  []w/heat      []  Ultrasound: []Continuous   [] Pulsed                       at: []1MHz   []3MHz Location:  W/cm2:      [] Paraffin         Location:   []w/heat      []  Ice     []  Heat  []  Ice massage Position:  Location:      []  Laser  []  Other: Position:  Location:         []  Vasopneumatic Device Pressure:       [] lo [] med [] hi   Temperature:        [x] Skin assessment post-treatment:  [x]intact []redness- no adverse reaction    []redness  adverse reaction:      20 min Therapeutic Exercise:  [x] See flow sheet :review and discussion of HEP   Rationale: increase ROM, increase strength, improve coordination, improve balance and increase proprioception to improve the patients ability to maintain cervical postural stability          40 min Manual Therapy: STM (light) bilateral upper trap, levator scapulae, cervical paraspinal and suboccipital region, grade 2-3 upslide and downslide C4/5-C7/T1, sub-occipital release   Rationale: decrease pain, increase ROM and increase tissue extensibility to improve the patients ability to maintain cervical postural stability        With   [] TE   [] TA   [] neuro   [] other: Patient Education: [x] Review HEP    [] Progressed/Changed HEP based on:   [] positioning   [] body mechanics   [] transfers   [] heat/ice application    [] other:       Other Objective/Functional Measures: Cerrvical AROM Rotation R 65   L 68          Pain Level (0-10 scale) post treatment: 0/10     ASSESSMENT/Changes in Function:   Reported less head and jaw pain with sub-occipital direct pressure. Increased release pressure less than 85% intensity. Reduced volume of HEP to only include seated forward nod/chin tuck and levator scpapulae stretching due to time constraints.   Patient will continue to benefit from skilled PT services to modify and progress therapeutic interventions, address functional mobility deficits, address ROM deficits, address strength deficits, analyze and address soft tissue restrictions, analyze and cue movement patterns, analyze and modify body mechanics/ergonomics and assess and modify postural abnormalities to attain remaining goals.     []  See Plan of Care  []  See progress note/recertification  []  See Discharge Summary     Progress towards goals / Updated goals:  Long Term Goals: To be accomplished in 4-6 weeks:  1) Pt will be able to read without increase of neck pain  2) Pt will be able to look over shoulders while driving without increase of neck pain  3) Pt will demonstrate ability to lift >/= 20 lbs without increase of symptoms  4) Pt will be able to sleep through the night without waking in pain  5) Pt will be able to rise from supine to sitting without head lag or pain.   6) Pt will be able to report </= 3 migraines per month  7) Pt will be independent with HEP MET        PLAN  [x]  Upgrade activities as tolerated     [x]  Continue plan of care  []  Update interventions per flow sheet       []  Discharge due to:_  []  Other:_               Karissa Ashraf, PT, DPT 3/8/2019

## 2019-03-08 NOTE — TELEPHONE ENCOUNTER
----- Message from [facility] sent at 3/7/2019  3:49 PM EST -----  Regarding: KOREY Crawford/Telephone  Contact: [phone]  Pt is checking to see if the office received her fax that was faxed on 3/4? Pt is requesting a call from Kaylie.

## 2019-03-11 ENCOUNTER — HOSPITAL ENCOUNTER (OUTPATIENT)
Dept: PHYSICAL THERAPY | Age: 39
Discharge: HOME | End: 2019-03-11
Payer: COMMERCIAL

## 2019-03-11 PROCEDURE — 97014 ELECTRIC STIMULATION THERAPY: CPT

## 2019-03-11 PROCEDURE — 97110 THERAPEUTIC EXERCISES: CPT

## 2019-03-11 PROCEDURE — 97140 MANUAL THERAPY 1/> REGIONS: CPT | Performed by: PHYSICAL THERAPIST

## 2019-03-11 NOTE — PROGRESS NOTES
PT DAILY TREATMENT NOTE - [facility] 2-15    Patient Name: Silvestre Rodriguez  Date:3/11/2019  : 1980  [x]  Patient  Verified  Payor: Richy Gallagher / Plan: Julio Ayala / Product Type: HMO /    In time:340p  Out time:451p  Total Treatment Time (min): 71  Total Timed Codes (min): 56  1:1 Treatment Time ([address] only): 64   Visit #:  5    Treatment Area: Concussion [S06.0X9A]    SUBJECTIVE  Pain Level (0-10 scale): 3/10  Any medication changes, allergies to medications, adverse drug reactions, diagnosis change, or new procedure performed?: [x] No    [] Yes (see summary sheet for update)  Subjective functional status/changes:   [] No changes reported  Pt reports that she did have soreness/pain in the shoulders this weekend but has felt a lot better following her therapy sessions overall. Today she is having a tough time from stress at work.     OBJECTIVE             Modality rationale: decrease pain to improve the patients ability to maintain cervical postural control   Min Type Additional Details        15 [x] Estim: []Att   [x]Unatt    []TENS instruct                  []IFC  [x]Premod   []NMES                     []Other:  []w/US   []w/ice   [x]w/heat  Position: supine  Location: neck          []  Traction: [] Cervical       []Lumbar                       [] Prone          []Supine                       []Intermittent   []Continuous Lbs:  [] before manual  [] after manual  []w/heat       []  Ultrasound: []Continuous   [] Pulsed                       at: []1MHz   []3MHz Location:  W/cm2:       [] Paraffin     Location:   []w/heat       []  Ice     []  Heat  []  Ice massage Position:  Location:       []  Laser  []  Other: Position:  Location:          []  Vasopneumatic Device Pressure:       [] lo [] med [] hi   Temperature:         [x] Skin assessment post-treatment:  [x]intact []redness- no adverse reaction    []redness  adverse reaction:      26 min Therapeutic Exercise:  [x] See flow sheet :review and discussion of HEP, exercises supervised by DELFINO Dow   Rationale: increase ROM, increase strength, improve coordination, improve balance and increase proprioception to improve the patients ability to maintain cervical postural stability           30 min Manual Therapy: STM (light) bilateral upper trap, levator scapulae, cervical paraspinal and suboccipital region, grade 2-3 upslide and downslide C4/5-C7/T1, sub-occipital release   Rationale: decrease pain, increase ROM and increase tissue extensibility to improve the patients ability to maintain cervical postural stability        With   [] TE   [] TA   [] neuro   [] other: Patient Education: [x] Review HEP    [] Progressed/Changed HEP based on:   [] positioning   [] body mechanics   [] transfers   [] heat/ice application    [] other:       Other Objective/Functional Measures: Cerrvical AROM Rotation R 50 --> 60   L 75 --> 78     Pain Level (0-10 scale) post treatment: 0/10     ASSESSMENT/Changes in Function:   Increased pressure today with sub-occipital release resulting in reduction of pain  and improved ROM. Instructed in use of chin tuck prior to rising from supine to reduce cervical strain.   Patient will continue to benefit from skilled PT services to modify and progress therapeutic interventions, address functional mobility deficits, address ROM deficits, address strength deficits, analyze and address soft tissue restrictions, analyze and cue movement patterns, analyze and modify body mechanics/ergonomics and assess and modify postural abnormalities to attain remaining goals.     []  See Plan of Care  []  See progress note/recertification  []  See Discharge Summary     Progress towards goals / Updated goals:  Long Term Goals: To be accomplished in 4-6 weeks:  1) Pt will be able to read without increase of neck pain  2) Pt will be able to look over shoulders while driving without increase of neck pain  3) Pt will demonstrate ability to lift >/= 20 lbs without increase of symptoms  4) Pt will be able to sleep through the night without waking in pain  5) Pt will be able to rise from supine to sitting without head lag or pain.   6) Pt will be able to report </= 3 migraines per month  7) Pt will be independent with HEP MET        PLAN  [x]  Upgrade activities as tolerated     [x]  Continue plan of care  []  Update interventions per flow sheet       []  Discharge due to:_  []  Other:_               Pearl Adan, PT, DPT 3/11/2019

## 2019-03-14 ENCOUNTER — HOSPITAL ENCOUNTER (OUTPATIENT)
Dept: PHYSICAL THERAPY | Age: 39
Discharge: HOME | End: 2019-03-14
Payer: COMMERCIAL

## 2019-03-14 PROCEDURE — 97014 ELECTRIC STIMULATION THERAPY: CPT | Performed by: PHYSICAL THERAPIST

## 2019-03-14 PROCEDURE — 97140 MANUAL THERAPY 1/> REGIONS: CPT | Performed by: PHYSICAL THERAPIST

## 2019-03-14 PROCEDURE — 97110 THERAPEUTIC EXERCISES: CPT | Performed by: PHYSICAL THERAPIST

## 2019-03-14 NOTE — PROGRESS NOTES
PT DAILY TREATMENT NOTE - [facility] 2-15    Patient Name: Valentino Hillock  Date:3/14/2019  : 1980  [x]  Patient  Verified  Payor: Hadley Epley / Plan: Jefry García / Product Type: HMO /    In time:310p  Out time:415p  Total Treatment Time (min): 65  Total Timed Codes (min): 50  1:1 Treatment Time ([address] only): 50   Visit #:  6    Treatment Area: Concussion [S06.0X9A]    SUBJECTIVE  Pain Level (0-10 scale): 4/10  Any medication changes, allergies to medications, adverse drug reactions, diagnosis change, or new procedure performed?: [x] No    [] Yes (see summary sheet for update)  Subjective functional status/changes:   [] No changes reported  Pt states that things are still stressful at work and leading ot headaches and tension in neck    OBJECTIVE                Modality rationale: decrease pain to improve the patients ability to maintain cervical postural control   Min Type Additional Details        15 [x] Estim: []Att   [x]Unatt    []TENS instruct                  []IFC  [x]Premod   []NMES                     []Other:  []w/US   []w/ice   [x]w/heat  Position: supine  Location: neck          []  Traction: [] Cervical       []Lumbar                       [] Prone          []Supine                       []Intermittent   []Continuous Lbs:  [] before manual  [] after manual  []w/heat       []  Ultrasound: []Continuous   [] Pulsed                       HY: []4NAX   []3MHz Location:  W/cm2:       [] Paraffin     Location:   []w/heat       []  Ice     []  Heat  []  Ice massage Position:  Location:       []  Laser  []  Other: Position:  Location:          []  Vasopneumatic Device Pressure:       [] lo [] med [] hi   Temperature:         [x] Skin assessment post-treatment:  [x]intact []redness- no adverse reaction    []redness  adverse reaction:      26 min Therapeutic Exercise:  [x] See flow sheet :review and discussion of HEP, exercises supervised by DELFINO Gregorio   Rationale: increase ROM, increase strength, improve coordination, improve balance and increase proprioception to improve the patients ability to maintain cervical postural stability           30 min Manual Therapy: STM (light) bilateral upper trap, levator scapulae, cervical paraspinal and suboccipital region, grade 2-3 upslide and downslide C4/5-C7/T1, sub-occipital release   Rationale: decrease pain, increase ROM and increase tissue extensibility to improve the patients ability to maintain cervical postural stability        With   [] TE   [] TA   [] neuro   [] other: Patient Education: [x] Review HEP    [] Progressed/Changed HEP based on:   [] positioning   [] body mechanics   [] transfers   [] heat/ice application    [] other:       Other Objective/Functional Measures: Cerrvical AROM Rotation R 50 --> 60   L 75 --> 78     Pain Level (0-10 scale) post treatment: 0/10     ASSESSMENT/Changes in Function:   Pt reported better resolution of pain with MHP and e-stim first.  Still limited in overall rotation but is tolerating increased pressure in sub-occipital region.   Patient will continue to benefit from skilled PT services to modify and progress therapeutic interventions, address functional mobility deficits, address ROM deficits, address strength deficits, analyze and address soft tissue restrictions, analyze and cue movement patterns, analyze and modify body mechanics/ergonomics and assess and modify postural abnormalities to attain remaining goals.     []  See Plan of Care  []  See progress note/recertification  []  See Discharge Summary     Progress towards goals / Updated goals:  Long Term Goals: To be accomplished in 4-6 weeks:  1) Pt will be able to read without increase of neck pain  2) Pt will be able to look over shoulders while driving without increase of neck pain  3) Pt will demonstrate ability to lift >/= 20 lbs without increase of symptoms  4) Pt will be able to sleep through the night without waking in pain  5) Pt will be able to rise from supine to sitting without head lag or pain.   6) Pt will be able to report </= 3 migraines per month  7) Pt will be independent with HEP MET        PLAN  [x]  Upgrade activities as tolerated     [x]  Continue plan of care  []  Update interventions per flow sheet       []  Discharge due to:_  []  Other:_             Pearl Adan, PT, DPT 3/14/2019

## 2019-03-15 ENCOUNTER — TELEPHONE (OUTPATIENT)
Dept: NEUROLOGY | Age: 39
End: 2019-03-15

## 2019-03-15 NOTE — TELEPHONE ENCOUNTER
----- Message from [facility] sent at 3/15/2019 11:02 AM EDT -----  Regarding: KOREY Crawford/Telephone  Contact: [phone]  Pt is checking on the status of some forms that are to be filled out by KOREY Cobian Prudent and when she can come to pick them up?

## 2019-03-18 ENCOUNTER — APPOINTMENT (OUTPATIENT)
Dept: PHYSICAL THERAPY | Age: 39
End: 2019-03-18
Payer: COMMERCIAL

## 2019-03-21 ENCOUNTER — APPOINTMENT (OUTPATIENT)
Dept: PHYSICAL THERAPY | Age: 39
End: 2019-03-21
Payer: COMMERCIAL

## 2019-03-25 ENCOUNTER — HOSPITAL ENCOUNTER (OUTPATIENT)
Dept: PHYSICAL THERAPY | Age: 39
Discharge: HOME | End: 2019-03-25
Payer: COMMERCIAL

## 2019-03-25 PROCEDURE — 97014 ELECTRIC STIMULATION THERAPY: CPT | Performed by: PHYSICAL THERAPIST

## 2019-03-25 PROCEDURE — 97140 MANUAL THERAPY 1/> REGIONS: CPT | Performed by: PHYSICAL THERAPIST

## 2019-03-25 NOTE — PROGRESS NOTES
PT DAILY TREATMENT NOTE - [facility] 2-15 Patient Name: Valentino Hillock Date:3/25/2019 : 1980 [x]  Patient  Verified Payor: Hadley Epley / Plan: Jefry Chol / Product Type: HMO /   
In time:340p  Out time:430p Total Treatment Time (min): 50 Total Timed Codes (min): 35 
1:1 Treatment Time ( only): 30 Visit #:  7 Treatment Area: Concussion [S06.0X9A] SUBJECTIVE Pain Level (0-10 scale): 5/10 Any medication changes, allergies to medications, adverse drug reactions, diagnosis change, or new procedure performed?: [x] No    [] Yes (see summary sheet for update) Subjective functional status/changes:   [] No changes reported Pt states that her duaghter was up all night and then so wwas she. She slept 2 hours. Dizziness is worse today. OBJECTIVE 
 
           
Modality rationale: decrease pain to improve the patients ability to maintain cervical postural control Min Type Additional Details    
  
15 [x] Estim: []Att   [x]Unatt    []TENS instruct 
                []IFC  [x]Premod   []NMES   
                 []Other:  []w/US   []w/ice   [x]w/heat Position: supine Location: neck    
  
  []  Traction: [] Cervical       []Lumbar 
                     [] Prone          []Supine 
                     []Intermittent   []Continuous Lbs: 
[] before manual 
[] after manual 
[]w/heat    
  []  Ultrasound: []Continuous   [] Pulsed  
                    QW: []4CAC   []3MHz Location: 
W/cm2:    
  [] Paraffin  
  Location:  
[]w/heat    
  []  Ice     []  Heat 
[]  Ice massage Position: Location:    
  []  Laser 
[]  Other: Position: Location:    
  
  []  Vasopneumatic Device Pressure:       [] lo [] med [] hi  
Temperature:     
  
[x] Skin assessment post-treatment:  [x]intact []redness- no adverse reaction 
  []redness  adverse reaction:  
  
5 min Therapeutic Exercise:  [x] See flow sheet :review and discussion of HEP, exercises supervised by DELFINO Hollins Rationale: increase ROM, increase strength, improve coordination, improve balance and increase proprioception to improve the patients ability to maintain cervical postural stability 
  
  
  
30 min Manual Therapy: STM (light) bilateral upper trap, levator scapulae, cervical paraspinal and suboccipital region, grade 2-3 upslide and downslide C4/5-C7/T1, sub-occipital release Rationale: decrease pain, increase ROM and increase tissue extensibility to improve the patients ability to maintain cervical postural stability 
  
  
With 
 [] TE 
 [] TA 
 [] neuro 
 [] other: Patient Education: [x] Review HEP   
[] Progressed/Changed HEP based on:  
[] positioning   [] body mechanics   [] transfers   [] heat/ice application   
[] other:   
  
Other Objective/Functional Measures: Cerrvical AROM Rotation R 50 --> 60   L 75 --> 78 
  
Pain Level (0-10 scale) post treatment: 0/10 
  
ASSESSMENT/Changes in Function:  
Did not push aggressively today or advance with therex due to increased irritation from lack of sleep. Patient will continue to benefit from skilled PT services to modify and progress therapeutic interventions, address functional mobility deficits, address ROM deficits, address strength deficits, analyze and address soft tissue restrictions, analyze and cue movement patterns, analyze and modify body mechanics/ergonomics and assess and modify postural abnormalities to attain remaining goals. 
  
[]  See Plan of Care 
[]  See progress note/recertification 
[]  See Discharge Summary 
  
Progress towards goals / Updated goals: 
Long Term Goals: To be accomplished in 4-6 weeks: 
1) Pt will be able to read without increase of neck pain 2) Pt will be able to look over shoulders while driving without increase of neck pain 3) Pt will demonstrate ability to lift >/= 20 lbs without increase of symptoms 4) Pt will be able to sleep through the night without waking in pain 5) Pt will be able to rise from supine to sitting without head lag or pain. 6) Pt will be able to report </= 3 migraines per month 7) Pt will be independent with HEP MET 
  
  
PLAN [x]  Upgrade activities as tolerated     [x]  Continue plan of care 
[]  Update interventions per flow sheet      
[]  Discharge due to:_ 
[]  Other:_   
  
 
 
 
Kymberly King, PT, DPT 3/25/2019

## 2019-03-28 ENCOUNTER — OFFICE VISIT (OUTPATIENT)
Dept: NEUROLOGY | Age: 39
End: 2019-03-28

## 2019-03-28 VITALS
DIASTOLIC BLOOD PRESSURE: 94 MMHG | OXYGEN SATURATION: 98 % | BODY MASS INDEX: 30.57 KG/M2 | HEIGHT: 69 IN | SYSTOLIC BLOOD PRESSURE: 136 MMHG | HEART RATE: 120 BPM | RESPIRATION RATE: 20 BRPM

## 2019-03-28 DIAGNOSIS — G43.009 MIGRAINE WITHOUT AURA AND WITHOUT STATUS MIGRAINOSUS, NOT INTRACTABLE: ICD-10-CM

## 2019-03-28 DIAGNOSIS — I10 ESSENTIAL HYPERTENSION: ICD-10-CM

## 2019-03-28 RX ORDER — LISINOPRIL 20 MG/1
20 TABLET ORAL DAILY
Qty: 30 TAB | Refills: 3 | Status: SHIPPED | OUTPATIENT
Start: 2019-03-28 | End: 2019-06-17 | Stop reason: SDUPTHER

## 2019-03-28 NOTE — PROGRESS NOTES
Date:  19     Name:  Ne Odell  :  1980  MRN:  [id number]     PCP:  None    Chief Complaint   Patient presents with    Migraine       HISTORY OF PRESENT ILLNESS: Follow-up for vertiginous migraine. At her last office visit, the Inderal LA was switched to Lisinopril as she was not getting any benefit with regard to either her blood pressure or migraines. She has not been tracking her blood pressures since the switch. She was started on Ajovy for migraines. No issues with the injections just no noticeable difference in the headache frequency. They may be shorter though and the Maxalt still works. She is going to post concussive therapy. She has seen rheumatology who wants to start her on [address] [place] [address] and she has some questions regarding the potential interaction with some of her other medications. She also saw her dentist and will be getting a new mouth guard for the bruxism and TMJ. Current Outpatient Medications   Medication Sig    nortriptyline (PAMELOR) 50 mg capsule TAKE 2 CAPS BY MOUTH NIGHTLY.  lisinopril (PRINIVIL, ZESTRIL) 10 mg tablet Take 1 Tab by mouth daily.  fremanezumab-vfrm (AJOVY) 225 mg/1.5 mL syrg 225 mg by SubCUTAneous route every three (3) months.  rizatriptan (MAXALT) 10 mg tablet TAKE 1 TAB BY MOUTH ONCE AS NEEDED FOR MIGRAINE FOR UP TO 1 DOSE. MAY REPEAT IN 2 HOURS IF NEEDED    melatonin 1 mg tablet Take  by mouth nightly.  gabapentin (NEURONTIN) 300 mg capsule Take 300 mg by mouth three (3) times daily.  ibuprofen (MOTRIN) 400 mg tablet Take 400 mg by mouth two (2) times daily as needed. No current facility-administered medications for this visit.       Allergies   Allergen Reactions    Codeine Nausea and Vomiting    Demerol [Meperidine] Nausea and Vomiting    Keflex [Cephalexin] Itching    Pcn [Penicillins] Hives     Past Medical History:   Diagnosis Date    Anxiety     Arrhythmia     Sinus Tach    Arthritis     Chronic pain     Fatigue     Fibromyalgia     DAVEY (generalized anxiety disorder)     Headache     Hypertension     Muscle pain     Nausea & vomiting     Other ill-defined conditions(799.89)     fibromyalgia    Other ill-defined conditions(799.89)     hypoglycemia    Snoring     Unspecified adverse effect of anesthesia     pain at IV site and pain through the vein with anesthesia    Vertigo     Visual disturbance      Past Surgical History:   Procedure Laterality Date    HX HEENT      wisdom teeth    HX ORTHOPAEDIC      right knee    HX ORTHOPAEDIC      right ankle     Social History     Socioeconomic History    Marital status:      Spouse name: Not on file    Number of children: Not on file    Years of education: Not on file    Highest education level: Not on file   Occupational History    Not on file   Social Needs    Financial resource strain: Not on file    Food insecurity:     Worry: Not on file     Inability: Not on file    Transportation needs:     Medical: Not on file     Non-medical: Not on file   Tobacco Use    Smoking status: Former Smoker     Packs/day: 0.50     Years: 6.00     Pack years: 3.00     Last attempt to quit: 10/15/2005     Years since quittin.4    Smokeless tobacco: Never Used   Substance and Sexual Activity    Alcohol use:  Yes     Alcohol/week: 1.0 oz     Types: 2 Glasses of wine per week     Comment: 1-4/week    Drug use: No    Sexual activity: Not on file   Lifestyle    Physical activity:     Days per week: Not on file     Minutes per session: Not on file    Stress: Not on file   Relationships    Social connections:     Talks on phone: Not on file     Gets together: Not on file     Attends Holiness service: Not on file     Active member of club or organization: Not on file     Attends meetings of clubs or organizations: Not on file     Relationship status: Not on file    Intimate partner violence:     Fear of current or ex partner: Not on file Emotionally abused: Not on file     Physically abused: Not on file     Forced sexual activity: Not on file   Other Topics Concern    Not on file   Social History Narrative    Not on file     Family History   Problem Relation Age of Onset    Post-op Nausea/Vomiting Mother     Cancer Mother     Heart Disease Father     Cancer Maternal Uncle     Lupus Paternal Uncle     Dementia Maternal Grandmother     Heart Disease Maternal Grandmother     Stroke Maternal Grandmother     Cancer Other        PHYSICAL EXAMINATION:    Visit Vitals  BP (!) 136/94   Pulse (!) 120   Resp 20   Ht 5' 9\" (1.753 m)   SpO2 98%   BMI 30.57 kg/m²     General: Well defined, nourished, and groomed individual in no acute distress. Neck: Supple, nontender, no bruits, no pain with resistance to active range of motion. Heart: Regular rate and rhythm, no murmurs, rub, or gallop. Normal S1S2. Lungs: Clear to auscultation bilaterally with equal chest expansion, no cough, no wheeze  Musculoskeletal: Extremities revealed no edema and had full range of motion of joints. Psych: Good mood and bright affect      NEUROLOGICAL EXAMINATION:   Mental Status: Alert and oriented to person, place, and time       Cranial Nerves:   II, III, IV, VI: Visual acuity grossly intact. Visual fields are normal.   Pupils are equal, round, and reactive to light and accommodation. Extra-ocular movements are full and fluid. Fundoscopic exam was benign, no ptosis or nystagmus. V-XII: Hearing is grossly intact. Facial features are symmetric, with normal sensation and strength. The palate rises symmetrically and the tongue protrudes midline. Sternocleidomastoids 5/5.       Motor Examination: Normal tone, bulk, and strength, 5/5 muscle strength throughout.      Coordination: Finger to nose was normal. No resting or intention tremor      Gait and Station: Steady while walking. Normal arm swing. No Rhomberg or pronator drift.  No muscle wasting or fasiculations noted.       Reflexes: DTRs 2+ throughout. ASSESSMENT AND PLAN    ICD-10-CM ICD-9-CM    1. Migraine without aura and without status migrainosus, not intractable G43.009 346.10 fremanezumab-vfrm (AJOVY) 225 mg/1.5 mL syrg      DISCONTINUED: fremanezumab-vfrm (AJOVY) 225 mg/1.5 mL syrg   2. Essential hypertension I10 401.9 lisinopril (PRINIVIL, ZESTRIL) 20 mg tablet     At this point, she has not seen a significant change in her headache pattern with the use of Ajovy except that perhaps they are shorter. Recommended she continue taking this monthly for cumulative benefit. She will continue to track her migraines as previously instructed. Additional therapy for the fibromyalgia as well as her TMJ may also provide some benefit with regard to migraine management in terms of management of potential triggers. With regard to her hypertension, this is improved on lisinopril 10 mg daily though she is still not quite to goal.  This was increased today to 20 mg daily. Recommended that she start tracking her blood pressures at home. Follow-up in 3 months or sooner if needed. Jena Harris

## 2019-03-28 NOTE — PROGRESS NOTES
Rheumatologist wants her to start taking Delisa Camel however her  told her to talk to you about it before she starts so that she isn't taking a medication that is in the same category as another medication that she is currently taking     Migraines- about the same, 1-2 a week, depending on the medication and when she can catch them  Chung is catching most of them,she still has a pretty lengthy recover/ migraine hangover     She only has taken the Ajovy one time so far trying to get it approved through insurance   The injection was fine no irritation noted after doing the injection

## 2019-03-29 ENCOUNTER — HOSPITAL ENCOUNTER (OUTPATIENT)
Dept: PHYSICAL THERAPY | Age: 39
Discharge: HOME | End: 2019-03-29
Payer: COMMERCIAL

## 2019-03-29 PROCEDURE — 97140 MANUAL THERAPY 1/> REGIONS: CPT | Performed by: PHYSICAL THERAPIST

## 2019-03-29 PROCEDURE — 97110 THERAPEUTIC EXERCISES: CPT | Performed by: PHYSICAL THERAPIST

## 2019-03-29 PROCEDURE — 97014 ELECTRIC STIMULATION THERAPY: CPT | Performed by: PHYSICAL THERAPIST

## 2019-03-29 NOTE — PROGRESS NOTES
PT DAILY TREATMENT NOTE - [facility] 2-15 Patient Name: Monroe Hernandez Date:3/29/2019 : 1980 [x]  Patient  Verified Payor: Joe Reina / Plan: Chuck Davila / Product Type: HMO /   
In Candice Ferrara Total Treatment Time (min): 60 Total Timed Codes (min): 45 
1:1 Treatment Time ([address] only): 30 Visit #:  6 Treatment Area: Concussion [S06.0X9A] SUBJECTIVE Pain Level (0-10 scale): 2/10 Any medication changes, allergies to medications, adverse drug reactions, diagnosis change, or new procedure performed?: [x] No    [] Yes (see summary sheet for update) Subjective functional status/changes:   [] No changes reported Pt states that she is doing better with more sleep. Still headache is most limiting. OBJECTIVE 
 
           
Modality rationale: decrease pain to improve the patients ability to maintain cervical postural control Min Type Additional Details    
  
15 [x] Estim: []Att   [x]Unatt    []TENS instruct 
                []IFC  [x]Premod   []NMES   
                 []Other:  []w/US   []w/ice   [x]w/heat Position: supine Location: neck    
  
  []  Traction: [] Cervical       []Lumbar 
                     [] Prone          []Supine 
                     []Intermittent   []Continuous Lbs: 
[] before manual 
[] after manual 
[]w/heat    
  []  Ultrasound: []Continuous   [] Pulsed  
                    CM: []8DKU   []3MHz Location: 
W/cm2:    
  [] Paraffin  
  Location:  
[]w/heat    
  []  Ice     []  Heat 
[]  Ice massage Position: Location:    
  []  Laser 
[]  Other: Position: Location:    
  
  []  Vasopneumatic Device Pressure:       [] lo [] med [] hi  
Temperature:     
  
[x] Skin assessment post-treatment:  [x]intact []redness- no adverse reaction 
  []redness  adverse reaction:  
  
5 min Therapeutic Exercise:  [x] See flow sheet :review and discussion of HEP, exercises supervised by DELFINO Gonzalez Rationale: increase ROM, increase strength, improve coordination, improve balance and increase proprioception to improve the patients ability to maintain cervical postural stability 
  
  
  
30 min Manual Therapy: STM (light) bilateral upper trap, levator scapulae, cervical paraspinal and suboccipital region, grade 2-3 upslide and downslide C4/5-C7/T1, sub-occipital release Rationale: decrease pain, increase ROM and increase tissue extensibility to improve the patients ability to maintain cervical postural stability 
  
  
With 
 [] TE 
 [] TA 
 [] neuro 
 [] other: Patient Education: [x] Review HEP   
[] Progressed/Changed HEP based on:  
[] positioning   [] body mechanics   [] transfers   [] heat/ice application   
[] other:   
  
Other Objective/Functional Measures: Cerrvical AROM Rotation R 56 --> 66   L 65 --> 66 
  
Pain Level (0-10 scale) post treatment: 0/10 
  
ASSESSMENT/Changes in Function:  
Improved rigth cervical mobility today with treatment. Pt reports that her left side of the neck began to hurt after doing rotation exercises. Advanced her HEP to include active cervical rotation and pec stretch. Patient will continue to benefit from skilled PT services to modify and progress therapeutic interventions, address functional mobility deficits, address ROM deficits, address strength deficits, analyze and address soft tissue restrictions, analyze and cue movement patterns, analyze and modify body mechanics/ergonomics and assess and modify postural abnormalities to attain remaining goals. 
  
[]  See Plan of Care 
[]  See progress note/recertification 
[]  See Discharge Summary 
  
Progress towards goals / Updated goals: 
Long Term Goals: To be accomplished in 4-6 weeks: 
1) Pt will be able to read without increase of neck pain 2) Pt will be able to look over shoulders while driving without increase of neck pain 3) Pt will demonstrate ability to lift >/= 20 lbs without increase of symptoms 4) Pt will be able to sleep through the night without waking in pain 5) Pt will be able to rise from supine to sitting without head lag or pain. 6) Pt will be able to report </= 3 migraines per month 7) Pt will be independent with HEP MET 
  
  
PLAN [x]  Upgrade activities as tolerated     [x]  Continue plan of care 
[]  Update interventions per flow sheet      
[]  Discharge due to:_ 
[]  Other:_   
 
 
 
Shaniqua Dominguez, PT, DPT 3/29/2019

## 2019-04-12 ENCOUNTER — HOSPITAL ENCOUNTER (OUTPATIENT)
Dept: PHYSICAL THERAPY | Age: 39
Discharge: HOME | End: 2019-04-12
Payer: COMMERCIAL

## 2019-04-12 PROCEDURE — 97140 MANUAL THERAPY 1/> REGIONS: CPT | Performed by: PHYSICAL THERAPIST

## 2019-04-12 PROCEDURE — 97110 THERAPEUTIC EXERCISES: CPT

## 2019-04-12 PROCEDURE — 97014 ELECTRIC STIMULATION THERAPY: CPT

## 2019-04-12 NOTE — PROGRESS NOTES
PT DAILY TREATMENT NOTE - [facility] 2-15 Patient Name: Luis Echeverria Date:2019 : 1980 [x]  Patient  Verified Payor: Danielle Rubi / Plan: Atilio Kyle / Product Type: HMO /   
In [place] Quitter Total Treatment Time (min): 60 Total Timed Codes (min): 45 
1:1 Treatment Time ( only): 45 Visit #:  5 Treatment Area: Concussion [S06.0X9A] SUBJECTIVE Pain Level (0-10 scale): 1.5/10 Any medication changes, allergies to medications, adverse drug reactions, diagnosis change, or new procedure performed?: [x] No    [] Yes (see summary sheet for update) Subjective functional status/changes:   [] No changes reported Pt reports that she has had a lot of stressors last week but she has noticed improvement of reduction of headaches. Chin tuck is helping with supine to sit. OBJECTIVE 
          
Modality rationale: decrease pain to improve the patients ability to maintain cervical postural control Min Type Additional Details    
  
15 [x] Estim: []Att   [x]Unatt    []TENS instruct 
                []IFC  [x]Premod   []NMES   
                 []Other:  []w/US   []w/ice   [x]w/heat Position: supine Location: neck    
  
  []  Traction: [] Cervical       []Lumbar 
                     [] Prone          []Supine 
                     []Intermittent   []Continuous Lbs: 
[] before manual 
[] after manual 
[]w/heat    
  []  Ultrasound: []Continuous   [] Pulsed  
                    UJ: []2FZB   []3MHz Location: 
W/cm2:    
  [] Paraffin  
  Location:  
[]w/heat    
  []  Ice     []  Heat 
[]  Ice massage Position: Location:    
  []  Laser 
[]  Other: Position: Location:    
  
  []  Vasopneumatic Device Pressure:       [] lo [] med [] hi  
Temperature:     
  
[x] Skin assessment post-treatment:  [x]intact []redness- no adverse reaction 
  []redness  adverse reaction:  
  
15 min Therapeutic Exercise:  [x] See flow sheet :review and discussion of HEP, exercises supervised by DELFINO Watters  
Rationale: increase ROM, increase strength, improve coordination, improve balance and increase proprioception to improve the patients ability to maintain cervical postural stability 
  
  
  
30 min Manual Therapy: STM (light) bilateral upper trap, levator scapulae, cervical paraspinal and suboccipital region, grade 2-3 upslide and downslide C4/5-C7/T1, sub-occipital release Rationale: decrease pain, increase ROM and increase tissue extensibility to improve the patients ability to maintain cervical postural stability 
  
  
With 
 [] TE 
 [] TA 
 [] neuro 
 [] other: Patient Education: [x] Review HEP   
[] Progressed/Changed HEP based on:  
[] positioning   [] body mechanics   [] transfers   [] heat/ice application   
[] other:   
  
Other Objective/Functional Measures: Cerrvical AROM Rotation R 70 --> 85   L 70 --> 83 
  
Pain Level (0-10 scale) post treatment: 1.5/10 
  
ASSESSMENT/Changes in Function:  
Active cervical rotation is improving. Will hold HEP at this time until she has completed successfully. Patient will continue to benefit from skilled PT services to modify and progress therapeutic interventions, address functional mobility deficits, address ROM deficits, address strength deficits, analyze and address soft tissue restrictions, analyze and cue movement patterns, analyze and modify body mechanics/ergonomics and assess and modify postural abnormalities to attain remaining goals. 
  
[]  See Plan of Care 
[]  See progress note/recertification 
[]  See Discharge Summary 
  
Progress towards goals / Updated goals: 
Long Term Goals: To be accomplished in 4-6 weeks: 
1) Pt will be able to read without increase of neck pain 2) Pt will be able to look over shoulders while driving without increase of neck pain 3) Pt will demonstrate ability to lift >/= 20 lbs without increase of symptoms 4) Pt will be able to sleep through the night without waking in pain 5) Pt will be able to rise from supine to sitting without head lag or pain. 6) Pt will be able to report </= 3 migraines per month 7) Pt will be independent with HEP MET 
  
  
PLAN [x]  Upgrade activities as tolerated     [x]  Continue plan of care 
[]  Update interventions per flow sheet      
[]  Discharge due to:_ 
[]  Other:_   
 
 
 
Murray Donovan, PT, DPT 4/12/2019

## 2019-04-19 ENCOUNTER — HOSPITAL ENCOUNTER (OUTPATIENT)
Dept: PHYSICAL THERAPY | Age: 39
Discharge: HOME | End: 2019-04-19
Payer: COMMERCIAL

## 2019-04-19 PROCEDURE — 97140 MANUAL THERAPY 1/> REGIONS: CPT | Performed by: PHYSICAL THERAPIST

## 2019-04-19 PROCEDURE — 97110 THERAPEUTIC EXERCISES: CPT | Performed by: PHYSICAL THERAPIST

## 2019-04-19 PROCEDURE — 97014 ELECTRIC STIMULATION THERAPY: CPT | Performed by: PHYSICAL THERAPIST

## 2019-04-19 NOTE — PROGRESS NOTES
PT DAILY TREATMENT NOTE - [facility] 2-15 Patient Name: Delonte Lewis Date:2019 : 1980 [x]  Patient  Verified Payor: Sushil Verdin / Plan: Guillermina Alarcon / Product Type: HMO /   
In [place]  Total Treatment Time (min): 70 Total Timed Codes (min): 55 
1:1 Treatment Time ( only): 45 Visit #:  12 Treatment Area: Concussion [S06.0X9A] SUBJECTIVE Pain Level (0-10 scale): 1.5/10 Any medication changes, allergies to medications, adverse drug reactions, diagnosis change, or new procedure performed?: [x] No    [] Yes (see summary sheet for update) Subjective functional status/changes:   [] No changes reported Pt states that she is doing okay today. OBJECTIVE 
  
          
Modality rationale: decrease pain to improve the patients ability to maintain cervical postural control Min Type Additional Details    
  
15 [x] Estim: []Att   [x]Unatt    []TENS instruct 
                []IFC  [x]Premod   []NMES   
                 []Other:  []w/US   []w/ice   [x]w/heat Position: supine Location: neck    
  
  []  Traction: [] Cervical       []Lumbar 
                     [] Prone          []Supine 
                     []Intermittent   []Continuous Lbs: 
[] before manual 
[] after manual 
[]w/heat    
  []  Ultrasound: []Continuous   [] Pulsed  
                    SC: []8SEF   []3MHz Location: 
W/cm2:    
  [] Paraffin  
  Location:  
[]w/heat    
  []  Ice     []  Heat 
[]  Ice massage Position: Location:    
  []  Laser 
[]  Other: Position: Location:    
  
  []  Vasopneumatic Device Pressure:       [] lo [] med [] hi  
Temperature:     
  
[x] Skin assessment post-treatment:  [x]intact []redness- no adverse reaction 
  []redness  adverse reaction:  
  
15 min Therapeutic Exercise:  [x] See flow sheet :  
Rationale: increase ROM, increase strength, improve coordination, improve balance and increase proprioception to improve the patients ability to maintain cervical postural stability 
  
  
  
30 min Manual Therapy: STM (light) bilateral upper trap, levator scapulae, cervical paraspinal and suboccipital region, grade 3-4 upslide and downslide C4/5-C7/T1, sub-occipital release Rationale: decrease pain, increase ROM and increase tissue extensibility to improve the patients ability to maintain cervical postural stability 
  
  
With 
 [] TE 
 [] TA 
 [] neuro 
 [] other: Patient Education: [x] Review HEP   
[] Progressed/Changed HEP based on:  
[] positioning   [] body mechanics   [] transfers   [] heat/ice application   
[] other:   
  
Other Objective/Functional Measures: Cerrvical AROM Rotation R  85   L  83 
  
Pain Level (0-10 scale) post treatment: 1.5/10 
  
ASSESSMENT/Changes in Function:  
Active cervical rotation is improving. Reporting less pain at end range rotation. Patient will continue to benefit from skilled PT services to modify and progress therapeutic interventions, address functional mobility deficits, address ROM deficits, address strength deficits, analyze and address soft tissue restrictions, analyze and cue movement patterns, analyze and modify body mechanics/ergonomics and assess and modify postural abnormalities to attain remaining goals. 
  
[]  See Plan of Care 
[]  See progress note/recertification 
[]  See Discharge Summary 
  
Progress towards goals / Updated goals: 
Long Term Goals: To be accomplished in 4-6 weeks: 
1) Pt will be able to read without increase of neck pain 2) Pt will be able to look over shoulders while driving without increase of neck pain 3) Pt will demonstrate ability to lift >/= 20 lbs without increase of symptoms 4) Pt will be able to sleep through the night without waking in pain 5) Pt will be able to rise from supine to sitting without head lag or pain. 6) Pt will be able to report </= 3 migraines per month 7) Pt will be independent with HEP MET 
  
  
PLAN 
 [x]  Upgrade activities as tolerated     [x]  Continue plan of care 
[]  Update interventions per flow sheet      
[]  Discharge due to:_ 
[]  Other:_   
  
 
 
 
Anders Rebolledo, PT, DPT 4/19/2019

## 2019-04-25 ENCOUNTER — HOSPITAL ENCOUNTER (OUTPATIENT)
Dept: PHYSICAL THERAPY | Age: 39
Discharge: HOME | End: 2019-04-25
Payer: COMMERCIAL

## 2019-04-25 PROCEDURE — 97110 THERAPEUTIC EXERCISES: CPT | Performed by: PHYSICAL THERAPIST

## 2019-04-25 PROCEDURE — 97014 ELECTRIC STIMULATION THERAPY: CPT | Performed by: PHYSICAL THERAPIST

## 2019-04-25 PROCEDURE — 97140 MANUAL THERAPY 1/> REGIONS: CPT | Performed by: PHYSICAL THERAPIST

## 2019-04-25 NOTE — PROGRESS NOTES
PT DAILY TREATMENT NOTE - [facility] -15 Patient Name: Jessica Delgado Date:2019 : 1980 [x]  Patient  Verified Payor: Kellen Bernard / Plan: Lorenza Vu / Product Type: HMO /   
In Yesica Shorten Total Treatment Time (min): 83 Total Timed Codes (min): 60 
1:1 Treatment Time ([address] only): 60 Visit #:  61 Treatment Area: Concussion [S06.0X9A] SUBJECTIVE Pain Level (0-10 scale): 2/10 Any medication changes, allergies to medications, adverse drug reactions, diagnosis change, or new procedure performed?: [x] No    [] Yes (see summary sheet for update) Subjective functional status/changes:   [] No changes reported Pt states that she is noticing a little more soreness after riding a roller coaster 2 days ago and she was clenching her teach more last night. OBJECTIVE 
  
          
Modality rationale: decrease pain to improve the patients ability to maintain cervical postural control Min Type Additional Details    
  
15 [x] Estim: []Att   [x]Unatt    []TENS instruct 
                []IFC  [x]Premod   []NMES   
                 []Other:  []w/US   []w/ice   [x]w/heat Position: supine Location: neck    
  
  []  Traction: [] Cervical       []Lumbar 
                     [] Prone          []Supine 
                     []Intermittent   []Continuous Lbs: 
[] before manual 
[] after manual 
[]w/heat    
  []  Ultrasound: []Continuous   [] Pulsed  
                    XK: []9MUM   []3MHz Location: 
W/cm2:    
  [] Paraffin  
  Location:  
[]w/heat    
 8 [x]  Ice     []  Heat 
[]  Ice massage Position: supine Location: neck and back    
  []  Laser 
[]  Other: Position: Location:    
  
  []  Vasopneumatic Device Pressure:       [] lo [] med [] hi  
Temperature:     
  
[x] Skin assessment post-treatment:  [x]intact []redness- no adverse reaction 
  []redness  adverse reaction:  
  
30 min Therapeutic Exercise:  [x] See flow sheet :  
 Rationale: increase ROM, increase strength, improve coordination, improve balance and increase proprioception to improve the patients ability to maintain cervical postural stability 
  
  
  
30 min Manual Therapy: STM (light) bilateral upper trap, levator scapulae, cervical paraspinal and suboccipital region, grade 3-4 upslide and downslide C4/5-C7/T1, sub-occipital release Rationale: decrease pain, increase ROM and increase tissue extensibility to improve the patients ability to maintain cervical postural stability 
  
  
With 
 [] TE 
 [] TA 
 [] neuro 
 [] other: Patient Education: [x] Review HEP   
[] Progressed/Changed HEP based on:  
[] positioning   [] body mechanics   [] transfers   [] heat/ice application   
[] other:   
  
Other Objective/Functional Measures: Cerrvical AROM Rotation R 82   L 86 
  
Pain Level (0-10 scale) post treatment: 1.5/10 
  
ASSESSMENT/Changes in Function:  
Reports increased fatigue noted with prone cervical retraction exercises at rep x sets of 4 x 3. Improved cervical rotation following active sub-cranial sidebend exercise. Patient will continue to benefit from skilled PT services to modify and progress therapeutic interventions, address functional mobility deficits, address ROM deficits, address strength deficits, analyze and address soft tissue restrictions, analyze and cue movement patterns, analyze and modify body mechanics/ergonomics and assess and modify postural abnormalities to attain remaining goals. 
  
[]  See Plan of Care 
[]  See progress note/recertification 
[]  See Discharge Summary 
  
Progress towards goals / Updated goals: 
Long Term Goals: To be accomplished in 4-6 weeks: 
1) Pt will be able to read without increase of neck pain Progressing 2) Pt will be able to look over shoulders while driving without increase of neck pain Progressing 3) Pt will demonstrate ability to lift >/= 20 lbs without increase of symptoms 4) Pt will be able to sleep through the night without waking in pain 5) Pt will be able to rise from supine to sitting without head lag or pain. 6) Pt will be able to report </= 3 migraines per month 7) Pt will be independent with HEP MET 
  
  
PLAN [x]  Upgrade activities as tolerated     [x]  Continue plan of care 
[]  Update interventions per flow sheet      
[]  Discharge due to:_ 
[]  Other Katarzyna Richey PT, DPT 4/25/2019

## 2019-05-01 ENCOUNTER — HOSPITAL ENCOUNTER (OUTPATIENT)
Dept: PHYSICAL THERAPY | Age: 39
Discharge: HOME | End: 2019-05-01
Payer: COMMERCIAL

## 2019-05-01 PROCEDURE — 97014 ELECTRIC STIMULATION THERAPY: CPT | Performed by: PHYSICAL THERAPIST

## 2019-05-01 PROCEDURE — 97110 THERAPEUTIC EXERCISES: CPT | Performed by: PHYSICAL THERAPIST

## 2019-05-01 PROCEDURE — 97140 MANUAL THERAPY 1/> REGIONS: CPT | Performed by: PHYSICAL THERAPIST

## 2019-05-01 NOTE — PROGRESS NOTES
PT DAILY TREATMENT NOTE - [facility] 2-15 Patient Name: Albino Endo Date:2019 : 1980 [x]  Patient  Verified Payor: Ashwin Menezes / Plan: Charlie Nab / Product Type: HMO /   
In Ariana Weaver Total Treatment Time (min): 60 Total Timed Codes (min): 45 
1:1 Treatment Time ( only): 40 Visit #:  91 Treatment Area: Concussion [S06.0X9A] SUBJECTIVE Pain Level (0-10 scale): 4/10 Any medication changes, allergies to medications, adverse drug reactions, diagnosis change, or new procedure performed?: [x] No    [] Yes (see summary sheet for update) Subjective functional status/changes:   [] No changes reported Pt states that she has started wearing a mouth guard at night for grinding her teeth and she is a little sore today. Pt also states that she is battling high level of depression and she is working with a counselor as well. OBJECTIVE 
          
Modality rationale: decrease pain to improve the patients ability to maintain cervical postural control Min Type Additional Details    
  
15 [x] Estim: []Att   [x]Unatt    []TENS instruct 
                []IFC  [x]Premod   []NMES   
                 []Other:  []w/US   []w/ice   [x]w/heat Position: supine Location: neck    
  
  []  Traction: [] Cervical       []Lumbar 
                     [] Prone          []Supine 
                     []Intermittent   []Continuous Lbs: 
[] before manual 
[] after manual 
[]w/heat    
  []  Ultrasound: []Continuous   [] Pulsed  
                    OZ: []0RUL   []3MHz Location: 
W/cm2:    
  [] Paraffin  
  Location:  
[]w/heat    
 8 [x]  Ice     []  Heat 
[]  Ice massage Position: supine Location: neck and back    
  []  Laser 
[]  Other: Position: Location:    
  
  []  Vasopneumatic Device Pressure:       [] lo [] med [] hi  
Temperature:     
  
[x] Skin assessment post-treatment:  [x]intact []redness- no adverse reaction 
  []redness  adverse reaction:  
  
 15 min Therapeutic Exercise:  [x] See flow sheet :  
Rationale: increase ROM, increase strength, improve coordination, improve balance and increase proprioception to improve the patients ability to maintain cervical postural stability 
  
  
  
30 min Manual Therapy: STM (light) bilateral upper trap, levator scapulae, cervical paraspinal and suboccipital region, grade 3-4 upslide and downslide C4/5-C7/T1, sub-occipital release Rationale: decrease pain, increase ROM and increase tissue extensibility to improve the patients ability to maintain cervical postural stability 
  
  
With 
 [] TE 
 [] TA 
 [] neuro 
 [] other: Patient Education: [x] Review HEP   
[] Progressed/Changed HEP based on:  
[] positioning   [] body mechanics   [] transfers   [] heat/ice application   
[] other:   
  
Other Objective/Functional Measures: Cerrvical AROM Rotation R 78   L 84 
  
Pain Level (0-10 scale) post treatment: 2/10 
  
ASSESSMENT/Changes in Function:  
Reviewed sub-cranial sidebending. Instructed in subcranial sidebending in AM then chin tuck an dAROM cervical rotation throughout the day as well as focusing on posture. Plan to discharge next week. Patient will continue to benefit from skilled PT services to modify and progress therapeutic interventions, address functional mobility deficits, address ROM deficits, address strength deficits, analyze and address soft tissue restrictions, analyze and cue movement patterns, analyze and modify body mechanics/ergonomics and assess and modify postural abnormalities to attain remaining goals. 
  
[]  See Plan of Care 
[]  See progress note/recertification 
[]  See Discharge Summary 
  
Progress towards goals / Updated goals: 
Long Term Goals: To be accomplished in 4-6 weeks: 
1) Pt will be able to read without increase of neck pain Progressing 2) Pt will be able to look over shoulders while driving without increase of neck pain Progressing 3) Pt will demonstrate ability to lift >/= 20 lbs without increase of symptoms 4) Pt will be able to sleep through the night without waking in pain 5) Pt will be able to rise from supine to sitting without head lag or pain. 6) Pt will be able to report </= 3 migraines per month 7) Pt will be independent with HEP MET 
  
  
PLAN [x]  Upgrade activities as tolerated     [x]  Continue plan of care 
[]  Update interventions per flow sheet      
[]  Discharge due to:_ 
[]  Other Rupinder Palma, PT, DPT 5/1/2019

## 2019-05-03 ENCOUNTER — HOSPITAL ENCOUNTER (OUTPATIENT)
Dept: PHYSICAL THERAPY | Age: 39
Discharge: HOME | End: 2019-05-03
Payer: COMMERCIAL

## 2019-05-03 PROCEDURE — 97014 ELECTRIC STIMULATION THERAPY: CPT | Performed by: PHYSICAL THERAPIST

## 2019-05-03 PROCEDURE — 97140 MANUAL THERAPY 1/> REGIONS: CPT | Performed by: PHYSICAL THERAPIST

## 2019-05-03 NOTE — PROGRESS NOTES
PT DAILY TREATMENT NOTE - [facility] 2-15 Patient Name: Gilda Pineda Date:5/3/2019 : 1980 [x]  Patient  Verified Payor: Rafael Rodríguez / Plan: Filippo Clancy / Product Type: HMO /   
In time:1215p  Out time:104p Total Treatment Time (min): 49 Total Timed Codes (min): 34 
1:1 Treatment Time ( only): 34 Visit #:  37 Treatment Area: Concussion [S06.0X9A] SUBJECTIVE Pain Level (0-10 scale): 4/10 Any medication changes, allergies to medications, adverse drug reactions, diagnosis change, or new procedure performed?: [x] No    [] Yes (see summary sheet for update) Subjective functional status/changes:   [] No changes reported Pt states that she is in more pain today from not sleeping well last night. OBJECTIVE 
          
Modality rationale: decrease pain to improve the patients ability to maintain cervical postural control Min Type Additional Details    
  
15 [x] Estim: []Att   [x]Unatt    []TENS instruct 
                []IFC  [x]Premod   []NMES   
                 []Other:  []w/US   []w/ice   [x]w/heat Position: supine Location: neck    
  
  []  Traction: [] Cervical       []Lumbar 
                     [] Prone          []Supine 
                     []Intermittent   []Continuous Lbs: 
[] before manual 
[] after manual 
[]w/heat    
  []  Ultrasound: []Continuous   [] Pulsed  
                    SZ: []9WQR   []3MHz Location: 
W/cm2:    
  [] Paraffin  
  Location:  
[]w/heat    
  []  Ice     []  Heat 
[]  Ice massage Position:  
Location:     
  []  Laser 
[]  Other: Position: Location:    
  
  []  Vasopneumatic Device Pressure:       [] lo [] med [] hi  
Temperature:     
  
[x] Skin assessment post-treatment:  [x]intact []redness- no adverse reaction 
  []redness  adverse reaction:  
  
  
  
34 min Manual Therapy: STM (light) bilateral upper trap, levator scapulae, cervical paraspinal and suboccipital region, grade 3-4 upslide and downslide C4/5-C7/T1, sub-occipital release Rationale: decrease pain, increase ROM and increase tissue extensibility to improve the patients ability to maintain cervical postural stability 
  
  
With 
 [] TE 
 [] TA 
 [] neuro 
 [] other: Patient Education: [x] Review HEP   
[] Progressed/Changed HEP based on:  
[] positioning   [] body mechanics   [] transfers   [] heat/ice application   
[] other:   
  
Other Objective/Functional Measures: Cerrvical AROM Rotation R 78   L 84 
  
Pain Level (0-10 scale) post treatment: 2/10 
  
ASSESSMENT/Changes in Function:  
Patient will continue to benefit from skilled PT services to modify and progress therapeutic interventions, address functional mobility deficits, address ROM deficits, address strength deficits, analyze and address soft tissue restrictions, analyze and cue movement patterns, analyze and modify body mechanics/ergonomics and assess and modify postural abnormalities to attain remaining goals. 
  
[]  See Plan of Care 
[]  See progress note/recertification 
[]  See Discharge Summary 
  
Progress towards goals / Updated goals: 
Long Term Goals: To be accomplished in 4-6 weeks: 
1) Pt will be able to read without increase of neck pain Progressing 2) Pt will be able to look over shoulders while driving without increase of neck pain Progressing 3) Pt will demonstrate ability to lift >/= 20 lbs without increase of symptoms 4) Pt will be able to sleep through the night without waking in pain 5) Pt will be able to rise from supine to sitting without head lag or pain. 6) Pt will be able to report </= 3 migraines per month 7) Pt will be independent with HEP MET 
  
  
PLAN [x]  Upgrade activities as tolerated     [x]  Continue plan of care 
[]  Update interventions per flow sheet      
[]  Discharge due to:_ 
[]  Other Stacie Hunter, PT, DPT 5/3/2019

## 2019-05-06 ENCOUNTER — HOSPITAL ENCOUNTER (OUTPATIENT)
Dept: PHYSICAL THERAPY | Age: 39
Discharge: HOME | End: 2019-05-06
Payer: COMMERCIAL

## 2019-05-06 PROCEDURE — 97014 ELECTRIC STIMULATION THERAPY: CPT | Performed by: PHYSICAL THERAPIST

## 2019-05-06 PROCEDURE — 97140 MANUAL THERAPY 1/> REGIONS: CPT | Performed by: PHYSICAL THERAPIST

## 2019-05-06 NOTE — PROGRESS NOTES
PT DAILY TREATMENT NOTE - [facility] 2-15 Patient Name: Kim Salmon Date:2019 : 1980 [x]  Patient  Verified Payor: Julisa Deutsch / Plan: Pablo Tello / Product Type: HMO /   
In time:1215p Out time:104p Total Treatment Time (min): 48 Total Timed Codes (min): 48 
1:1 Treatment Time ( only): 33 Visit #:  74 Treatment Area: Concussion [S06.0X9A] SUBJECTIVE Pain Level (0-10 scale): 4/10 Any medication changes, allergies to medications, adverse drug reactions, diagnosis change, or new procedure performed?: [x] No    [] Yes (see summary sheet for update) Subjective functional status/changes:   [] No changes reported Pt states that she is under increased stress with an injury to her daughter and her neck is hurting more. OBJECTIVE 
          
Modality rationale: decrease pain to improve the patients ability to maintain cervical postural control Min Type Additional Details    
  
15 [x] Estim: []Att   [x]Unatt    []TENS instruct 
                []IFC  [x]Premod   []NMES   
                 []Other:  []w/US   []w/ice   [x]w/heat Position: supine Location: neck    
  
  []  Traction: [] Cervical       []Lumbar 
                     [] Prone          []Supine 
                     []Intermittent   []Continuous Lbs: 
[] before manual 
[] after manual 
[]w/heat    
  []  Ultrasound: []Continuous   [] Pulsed  
                    Ukrainian: []2CDZ   []3MHz Location: 
W/cm2:    
  [] Paraffin  
  Location:  
[]w/heat    
 []  Ice     []  Heat 
[]  Ice massage Position: Location:    
  []  Laser 
[]  Other: Position: Location:    
  
  []  Vasopneumatic Device Pressure:       [] lo [] med [] hi  
Temperature:     
  
[x] Skin assessment post-treatment:  [x]intact []redness- no adverse reaction 
  []redness  adverse reaction:  
  
3 min Therapeutic Exercise:  [x] See flow sheet :  
Rationale: increase ROM, increase strength, improve coordination, improve balance and increase proprioception to improve the patients ability to maintain cervical postural stability 
  
  
  
30 min Manual Therapy: STM (light) bilateral upper trap, levator scapulae, cervical paraspinal and suboccipital region, grade 3-4 upslide and downslide C4/5-C7/T1, sub-occipital release Rationale: decrease pain, increase ROM and increase tissue extensibility to improve the patients ability to maintain cervical postural stability 
  
  
With 
 [] TE 
 [] TA 
 [] neuro 
 [] other: Patient Education: [x] Review HEP   
[] Progressed/Changed HEP based on:  
[] positioning   [] body mechanics   [] transfers   [] heat/ice application   
[] other:   
  
Other Objective/Functional Measures: Cerrvical AROM Rotation R 78   L 84 
  
Pain Level (0-10 scale) post treatment: 2/10 
  
ASSESSMENT/Changes in Function:  
Improved right rotation today with manual treatment. Patient will continue to benefit from skilled PT services to modify and progress therapeutic interventions, address functional mobility deficits, address ROM deficits, address strength deficits, analyze and address soft tissue restrictions, analyze and cue movement patterns, analyze and modify body mechanics/ergonomics and assess and modify postural abnormalities to attain remaining goals. 
  
[]  See Plan of Care 
[]  See progress note/recertification 
[]  See Discharge Summary 
  
Progress towards goals / Updated goals: 
Long Term Goals: To be accomplished in 4-6 weeks: 
1) Pt will be able to read without increase of neck pain Progressing 2) Pt will be able to look over shoulders while driving without increase of neck pain Progressing 3) Pt will demonstrate ability to lift >/= 20 lbs without increase of symptoms 4) Pt will be able to sleep through the night without waking in pain 5) Pt will be able to rise from supine to sitting without head lag or pain. 6) Pt will be able to report </= 3 migraines per month 7) Pt will be independent with HEP MET 
  
  
PLAN [x]  Upgrade activities as tolerated     [x]  Continue plan of care 
[]  Update interventions per flow sheet      
[]  Discharge due to:_ 
[]  Other Liang Cast, PT, DPT 5/6/2019

## 2019-05-08 ENCOUNTER — HOSPITAL ENCOUNTER (OUTPATIENT)
Dept: PHYSICAL THERAPY | Age: 39
Discharge: HOME | End: 2019-05-08
Payer: COMMERCIAL

## 2019-05-08 PROCEDURE — 97110 THERAPEUTIC EXERCISES: CPT | Performed by: PHYSICAL THERAPIST

## 2019-05-08 PROCEDURE — 97140 MANUAL THERAPY 1/> REGIONS: CPT | Performed by: PHYSICAL THERAPIST

## 2019-05-08 PROCEDURE — 97014 ELECTRIC STIMULATION THERAPY: CPT | Performed by: PHYSICAL THERAPIST

## 2019-05-08 NOTE — PROGRESS NOTES
PT DAILY TREATMENT NOTE - [facility] 2-15 Patient Name: Pilar Vasquez Date:2019 : 1980 [x]  Patient  Verified Payor: Chinedu Mercado / Plan: Letitia Ahn / Product Type: HMO /   
In time:1000a  Out time:1100a Total Treatment Time (min): 60 Total Timed Codes (min): 60 
1:1 Treatment Time ([address] only): 60 Visit #:  15 
 
Treatment Area: Concussion [H40.8T9D] SUBJECTIVE Pain Level (0-10 scale): 3/10 Any medication changes, allergies to medications, adverse drug reactions, diagnosis change, or new procedure performed?: [x] No    [] Yes (see summary sheet for update) Subjective functional status/changes:   [] No changes reported Pt reports that she feels she can manage her symptoms on her own at this point. OBJECTIVE 
          
Modality rationale: decrease pain to improve the patients ability to maintain cervical postural control Min Type Additional Details    
  
15 [x] Estim: []Att   [x]Unatt    []TENS instruct 
                []IFC  [x]Premod   []NMES   
                 []Other:  []w/US   []w/ice   [x]w/heat Position: supine Location: neck    
  
  []  Traction: [] Cervical       []Lumbar 
                     [] Prone          []Supine 
                     []Intermittent   []Continuous Lbs: 
[] before manual 
[] after manual 
[]w/heat    
  []  Ultrasound: []Continuous   [] Pulsed  
                    EL: []4CTP   []3MHz Location: 
W/cm2:    
  [] Paraffin  
  Location:  
[]w/heat    
 []  Ice     []  Heat 
[]  Ice massage Position:  
Location:     
  []  Laser 
[]  Other: Position: Location:    
  
  []  Vasopneumatic Device Pressure:       [] lo [] med [] hi  
Temperature:     
  
[x] Skin assessment post-treatment:  [x]intact []redness- no adverse reaction 
  []redness  adverse reaction:  
  
30 min Therapeutic Exercise:  [x] See flow sheet : review of current status and instruction for HEP.   
Rationale: increase ROM, increase strength, improve coordination, improve balance and increase proprioception to improve the patients ability to maintain cervical postural stability 
  
  
  
15 min Manual Therapy: STM (light) bilateral upper trap, levator scapulae, cervical paraspinal and suboccipital region, grade 3-4 upslide and downslide C4/5-C7/T1, sub-occipital release Rationale: decrease pain, increase ROM and increase tissue extensibility to improve the patients ability to maintain cervical postural stability 
  
  
With 
 [] TE 
 [] TA 
 [] neuro 
 [] other: Patient Education: [x] Review HEP   
[] Progressed/Changed HEP based on:  
[] positioning   [] body mechanics   [] transfers   [] heat/ice application   
[] other:   
  
Other Objective/Functional Measures: Cerrvical AROM Rotation R 78   L 84 
  
Pain Level (0-10 scale) post treatment: 2/10 
  
ASSESSMENT/Changes in Function:  
 
[]  See Plan of Care 
[]  See progress note/recertification [x]  See Discharge Summary 
  
Progress towards goals / Updated goals: 
Long Term Goals: To be accomplished in 4-6 weeks: 
1) Pt will be able to read without increase of neck pain Partially MET 
2) Pt will be able to look over shoulders while driving without increase of neck pain MET 3) Pt will demonstrate ability to lift >/= 20 lbs without increase of symptoms MET 4) Pt will be able to sleep through the night without waking in pain NOT MET 
5) Pt will be able to rise from supine to sitting without head lag or pain. Partially MET 6) Pt will be able to report </= 3 migraines per month NOT MET 
7) Pt will be independent with HEP MET 
  
  
PLAN 
[]  Upgrade activities as tolerated     []  Continue plan of care 
[]  Update interventions per flow sheet      
[x]  Discharge due to:_plateau of progression and readiness to continue on own 
[]  Other Nadia Alejandre, PT, DPT 5/8/2019

## 2019-05-15 NOTE — ANCILLARY DISCHARGE INSTRUCTIONS
[facility] [address] [address], [address] Phone: [phone]  Fax: [phone] Discharge Summary  2-15 Patient name: Monroe Hernandez  : 1980  Provider#: [id number] Referral source: Lucas Lopez NP Medical/Treatment Diagnosis: Concussion [S06.0X9A] Prior Hospitalization: see medical history Comorbidities: fibromyalgia, migraines Prior Level of Function: complete 20 minutes of exercise seldom or never Medications: Verified on Patient Summary List 
  
Start of Care: 2019                                                                              Onset Date: > 5 yrs ago Visits from Start of Care: 15     Missed Visits: 0 Reporting Period : 2019 to 2019 Progress towards goals / Updated goals: 
Long Term Goals: To be accomplished in 4-6 weeks: 
1) Pt will be able to read without increase of neck pain Partially MET 
2) Pt will be able to look over shoulders while driving without increase of neck pain MET 3) Pt will demonstrate ability to lift >/= 20 lbs without increase of symptoms MET 4) Pt will be able to sleep through the night without waking in pain NOT MET 
5) Pt will be able to rise from supine to sitting without head lag or pain. Partially MET 6) Pt will be able to report </= 3 migraines per month NOT MET 
7) Pt will be independent with HEP MET 
 
 
ASSESSMENT/SUMMARY OF CARE:  Seth Lantigua has noted some improvement in overall cervical mobility but is still feeling pain in the neck/shoulders as her activity and stress levels increase. She has been instructed in self management techniques and home exercise program. 
 
Cervical AROM Rotation R 78   L 84 RECOMMENDATIONS: Discontinue treatment at this time due to maximally achieved benefit. [x]Discontinue therapy: [x]Patient has reached or is progressing toward set goals []Patient is non-compliant or has abdicated []Due to lack of appreciable progress towards set goals Anders Miu, PT, DPT 5/15/2019

## 2019-06-07 DIAGNOSIS — G43.109 VERTIGINOUS MIGRAINE: ICD-10-CM

## 2019-06-10 RX ORDER — RIZATRIPTAN BENZOATE 10 MG/1
TABLET ORAL
Qty: 9 TAB | Refills: 0 | Status: SHIPPED | OUTPATIENT
Start: 2019-06-10 | End: 2019-07-29 | Stop reason: SDUPTHER

## 2019-06-17 DIAGNOSIS — I10 ESSENTIAL HYPERTENSION: ICD-10-CM

## 2019-06-17 RX ORDER — LISINOPRIL 20 MG/1
TABLET ORAL
Qty: 30 TAB | Refills: 2 | Status: SHIPPED | OUTPATIENT
Start: 2019-06-17 | End: 2019-07-10 | Stop reason: SDUPTHER

## 2019-06-19 DIAGNOSIS — G43.109 VERTIGINOUS MIGRAINE: ICD-10-CM

## 2019-06-19 RX ORDER — NORTRIPTYLINE HYDROCHLORIDE 50 MG/1
CAPSULE ORAL
Qty: 180 CAP | Refills: 1 | Status: SHIPPED | OUTPATIENT
Start: 2019-06-19 | End: 2019-07-25

## 2019-07-10 DIAGNOSIS — I10 ESSENTIAL HYPERTENSION: ICD-10-CM

## 2019-07-11 RX ORDER — LISINOPRIL 20 MG/1
TABLET ORAL
Qty: 30 TAB | Refills: 2 | Status: SHIPPED | OUTPATIENT
Start: 2019-07-11 | End: 2019-07-25

## 2019-07-25 ENCOUNTER — OFFICE VISIT (OUTPATIENT)
Dept: NEUROLOGY | Age: 39
End: 2019-07-25

## 2019-07-25 VITALS
DIASTOLIC BLOOD PRESSURE: 98 MMHG | BODY MASS INDEX: 30.57 KG/M2 | SYSTOLIC BLOOD PRESSURE: 154 MMHG | HEART RATE: 109 BPM | HEIGHT: 69 IN | OXYGEN SATURATION: 98 % | RESPIRATION RATE: 20 BRPM

## 2019-07-25 DIAGNOSIS — I10 ESSENTIAL HYPERTENSION: ICD-10-CM

## 2019-07-25 DIAGNOSIS — G43.009 MIGRAINE WITHOUT AURA AND WITHOUT STATUS MIGRAINOSUS, NOT INTRACTABLE: ICD-10-CM

## 2019-07-25 DIAGNOSIS — G43.109 VERTIGINOUS MIGRAINE: Primary | ICD-10-CM

## 2019-07-25 RX ORDER — CLONAZEPAM 0.5 MG/1
TABLET ORAL
COMMUNITY
Start: 2019-06-04

## 2019-07-25 RX ORDER — GUAIFENESIN 1200 MG
TABLET, EXTENDED RELEASE 12 HR ORAL
COMMUNITY

## 2019-07-25 RX ORDER — ONDANSETRON HYDROCHLORIDE 8 MG/1
TABLET, FILM COATED ORAL
Refills: 1 | COMMUNITY
Start: 2019-04-24 | End: 2019-10-01

## 2019-07-25 RX ORDER — BACLOFEN 20 MG
1000 TABLET ORAL DAILY
COMMUNITY

## 2019-07-25 RX ORDER — VERAPAMIL HYDROCHLORIDE 80 MG/1
TABLET ORAL
Refills: 0 | COMMUNITY
Start: 2019-07-18

## 2019-07-25 NOTE — PROGRESS NOTES
Date:  19     Name:  Nelson Smith  :  1980  MRN:  [id number]     PCP:  None    Chief Complaint   Patient presents with    Migraine       HISTORY OF PRESENT ILLNESS: Follow-up for vertiginous migraine. At her last office visit, she was to continue with the [address] [place] [address] Avenue for cumulative benefit. She feels like the frequency is better, but the duration and the intensity is worse. Since then, she has seen her rheumatologist who tried her on [address] [place] [address] which was an epic fail because of significant side effects. He then recommended that she concentrate on managing the anxiety. Her PCP started the Trintellix and she has recently increased the dose. This is causing nausea though she did well for about a week despite not using Zofran prior to increasing this to 20mg. In the meantime, she has been told to wean off of the Nortriptyline. She is now down to 25mg for the last three days. She has been to the [facility] since her last office visit and it was suggested she start Verapamil. She has only been on this for three days. Current Outpatient Medications   Medication Sig    acetaminophen (TYLENOL) 325 mg cap Take  by mouth.  magnesium oxide 500 mg tab Take 1,000 mg by mouth daily.  clonazePAM (KLONOPIN) 0.5 mg tablet     vortioxetine (TRINTELLIX) 10 mg tablet Take 20 mg by mouth.  ondansetron hcl (ZOFRAN) 8 mg tablet TAKE ONE TWICE DAILY AS NEEDED FOR NAUSEA    verapamil (CALAN) 80 mg tablet TAKE 1 TABLET BY MOUTH TWICE A DAY    fexofenadine HCl (ALLEGRA PO) Take  by mouth.  rizatriptan (MAXALT) 10 mg tablet TAKE 1 TAB BY MOUTH ONCE AS NEEDED FOR MIGRAINE FOR UP TO 1 DOSE. MAY REPEAT IN 2 HOURS IF NEEDED    fremanezumab-vfrm (AJOVY) 225 mg/1.5 mL syrg 225 mg by SubCUTAneous route every thirty (30) days.  melatonin 1 mg tablet Take  by mouth nightly.  ibuprofen (MOTRIN) 400 mg tablet Take 400 mg by mouth two (2) times daily as needed.        No current facility-administered medications for this visit. Allergies   Allergen Reactions    Codeine Nausea and Vomiting    Demerol [Meperidine] Nausea and Vomiting    Keflex [Cephalexin] Itching    Pcn [Penicillins] Hives     Past Medical History:   Diagnosis Date    Anxiety     Arrhythmia     Sinus Tach    Arthritis     Chronic pain     Fatigue     Fibromyalgia     DAVEY (generalized anxiety disorder)     Headache     Hypertension     Muscle pain     Nausea & vomiting     Other ill-defined conditions(799.89)     fibromyalgia    Other ill-defined conditions(799.89)     hypoglycemia    Snoring     Unspecified adverse effect of anesthesia     pain at IV site and pain through the vein with anesthesia    Vertigo     Visual disturbance      Past Surgical History:   Procedure Laterality Date    HX HEENT      wisdom teeth    HX ORTHOPAEDIC      right knee    HX ORTHOPAEDIC      right ankle     Social History     Socioeconomic History    Marital status:      Spouse name: Not on file    Number of children: Not on file    Years of education: Not on file    Highest education level: Not on file   Occupational History    Not on file   Social Needs    Financial resource strain: Not on file    Food insecurity:     Worry: Not on file     Inability: Not on file    Transportation needs:     Medical: Not on file     Non-medical: Not on file   Tobacco Use    Smoking status: Former Smoker     Packs/day: 0.50     Years: 6.00     Pack years: 3.00     Last attempt to quit: 10/15/2005     Years since quittin.7    Smokeless tobacco: Never Used   Substance and Sexual Activity    Alcohol use:  Yes     Alcohol/week: 1.7 standard drinks     Types: 2 Glasses of wine per week     Comment: 1-4/week    Drug use: No    Sexual activity: Not on file   Lifestyle    Physical activity:     Days per week: Not on file     Minutes per session: Not on file    Stress: Not on file   Relationships    Social connections: Talks on phone: Not on file     Gets together: Not on file     Attends Jainism service: Not on file     Active member of club or organization: Not on file     Attends meetings of clubs or organizations: Not on file     Relationship status: Not on file    Intimate partner violence:     Fear of current or ex partner: Not on file     Emotionally abused: Not on file     Physically abused: Not on file     Forced sexual activity: Not on file   Other Topics Concern    Not on file   Social History Narrative    Not on file     Family History   Problem Relation Age of Onset    Post-op Nausea/Vomiting Mother     Cancer Mother     Heart Disease Father     Cancer Maternal Uncle     Lupus Paternal Uncle     Dementia Maternal Grandmother     Heart Disease Maternal Grandmother     Stroke Maternal Grandmother     Cancer Other        PHYSICAL EXAMINATION:    Visit Vitals  BP (!) 154/98   Pulse (!) 109   Resp 20   Ht 5' 9\" (1.753 m)   SpO2 98%   BMI 30.57 kg/m²     General: Well defined, nourished, and groomed individual in no acute distress. Neck: Supple, nontender, no bruits, no pain with resistance to active range of motion. Heart: Regular rate and rhythm, no murmurs, rub, or gallop. Normal S1S2. Lungs: Clear to auscultation bilaterally with equal chest expansion, no cough, no wheeze  Musculoskeletal: Extremities revealed no edema and had full range of motion of joints. Psych: Good mood and bright affect      NEUROLOGICAL EXAMINATION:   Mental Status: Alert and oriented to person, place, and time       Cranial Nerves:   II, III, IV, VI: Visual acuity grossly intact. Visual fields are normal.   Pupils are equal, round, and reactive to light and accommodation. Extra-ocular movements are full and fluid. Fundoscopic exam was benign, no ptosis or nystagmus. V-XII: Hearing is grossly intact. Facial features are symmetric, with normal sensation and strength.  The palate rises symmetrically and the tongue protrudes midline. Sternocleidomastoids 5/5.       Motor Examination: Normal tone, bulk, and strength, 5/5 muscle strength throughout.      Coordination: Finger to nose was normal. No resting or intention tremor      Gait and Station: Steady while walking. Normal arm swing. No Rhomberg or pronator drift. No muscle wasting or fasiculations noted.       Reflexes: DTRs 2+ throughout. ASSESSMENT AND PLAN    ICD-10-CM ICD-9-CM    1. Vertiginous migraine G43.109 346.80 REFERRAL TO NEUROLOGY     780.4    2. Essential hypertension I10 401.9    3. Migraine without aura and without status migrainosus, not intractable G43.009 346.10 KETOROLAC TROMETHAMINE INJ      THER/PROPH/DIAG INJECTION, SUBCUT/IM      REFERRAL TO NEUROLOGY     At this point, she is starting some new therapies that may be of some benefit with regard to managing her headaches to include the Trintellix and verapamil. Since she just started these medications with some recent dosage adjustments, I would like to give this some time to see how she does prior to adding or changing anything else. However, if there is no improvement I would like to pursue Botox and as such I would like to go ahead and see if we can get the prior authorization on the offshoot that these recent interventions are not of any benefit. If they do seem to help, we can always cancel the Botox. She does have a current migraine, she was provided with an injection of Toradol for acute management. Follow-up in 3 months or sooner if needed. Jena Milner

## 2019-07-25 NOTE — PROGRESS NOTES
Migraines- not great, second one in the last 2 weeks   Severity- its been pretty bad it hasn't really been lasting this long especially after 2 maxalt   She is feeling particularly bad today   Lasting usually about 24 hours right now she is working on about 30 plus hours   Extreme fatigue, hangover feeling

## 2019-07-29 DIAGNOSIS — G43.109 VERTIGINOUS MIGRAINE: ICD-10-CM

## 2019-07-29 RX ORDER — RIZATRIPTAN BENZOATE 10 MG/1
TABLET ORAL
Qty: 9 TAB | Refills: 0 | Status: SHIPPED | OUTPATIENT
Start: 2019-07-29 | End: 2019-08-15 | Stop reason: SDUPTHER

## 2019-08-09 ENCOUNTER — TELEPHONE (OUTPATIENT)
Dept: NEUROLOGY | Age: 39
End: 2019-08-09

## 2019-08-09 DIAGNOSIS — G43.009 MIGRAINE WITHOUT AURA AND WITHOUT STATUS MIGRAINOSUS, NOT INTRACTABLE: ICD-10-CM

## 2019-08-09 NOTE — TELEPHONE ENCOUNTER
----- Message from Sherrine Claude sent at 8/9/2019 12:45 PM EDT -----  Regarding: Dr. Leon King from [facility] specialty pharmacy requesting Rx \"Botox\" rewritten by original provider. Best contact [phone].

## 2019-08-12 RX ORDER — FREMANEZUMAB-VFRM 225 MG/1.5ML
INJECTION SUBCUTANEOUS
Qty: 1.5 SYRINGE | Refills: 2 | Status: SHIPPED | OUTPATIENT
Start: 2019-08-12 | End: 2019-10-18 | Stop reason: SDUPTHER

## 2019-08-15 DIAGNOSIS — G43.109 VERTIGINOUS MIGRAINE: ICD-10-CM

## 2019-08-19 RX ORDER — RIZATRIPTAN BENZOATE 10 MG/1
TABLET ORAL
Qty: 9 TAB | Refills: 0 | Status: SHIPPED | OUTPATIENT
Start: 2019-08-19

## 2019-09-05 ENCOUNTER — OFFICE VISIT (OUTPATIENT)
Dept: NEUROLOGY | Age: 39
End: 2019-09-05

## 2019-09-05 DIAGNOSIS — G43.009 MIGRAINE WITHOUT AURA AND WITHOUT STATUS MIGRAINOSUS, NOT INTRACTABLE: Primary | ICD-10-CM

## 2019-09-05 NOTE — PROCEDURES
[facility] [place]  OFFICE PROCEDURE PROGRESS NOTE        Chart reviewed for the following:   Dieter Haney MD, have reviewed the History, Physical and updated the Allergic reactions for [address] performed immediately prior to start of procedure:   Dieter Haney MD, have performed the following reviews on Thedore Share prior to the start of the procedure:            * Patient was identified by name and date of birth   * Agreement on procedure being performed was verified  * Risks and Benefits explained to the patient  * Procedure site verified and marked as necessary  * Patient was positioned for comfort  * Consent was signed and verified     Time: 0466  Date of procedure: 9/5/2019  Procedure performed by:  Facundo Simon MD  Provider assisted by: None  Patient assisted by: self  How tolerated by patient: tolerated the procedure well with no complications  Comments: None    Botox Injection Note    Indication:   Patient has chronic migraine, and has tried/ failed multiple headache preventive medications (see clinic notes for details). She presents her first round of Botox Injection to reduce overall headache frequency, including migraines. Procedure:    Botox concentration: 200 units in 4 ml of preservative-free normal saline.   31 sites injections, distribution as follow         Units/site Sites Sides subtotal  Procerus     5  1 1 5        5   1 2 10  Frontalis     5  2 2 20  Temporalis    5  4 2 40  Occipitalis    5  3 2 30   Upper cervical paraspinalis  5  2 2 20   Trapezius    5  3 2 30    200 units Botox were reconstituted, 155 units injected as above and the remainder was unavoidably wasted    Patient tolerated procedure well.      _____________________________  Facundo Simon M.D.

## 2019-09-05 NOTE — PATIENT INSTRUCTIONS
OnabotulinumtoxinA (By injection)   OnabotulinumtoxinA (gl-q-dqe-eb-HJA-ovr-tox-in-ay)  Treats muscle stiffness, muscle spasms, excessive sweating, overactive bladder, or loss of bladder control. Prevents chronic migraine headaches. Improves the appearance of wrinkles on the face. Brand Name(s): Botox, Botox Cosmetic   There may be other brand names for this medicine. When This Medicine Should Not Be Used: This medicine is not right for everyone. You should not receive this medicine if you had an allergic reaction to onabotulinumtoxinA or any other botulinum toxin product. How to Use This Medicine:   Injectable  · Your doctor will prescribe your exact dose and tell you how often it should be given. This medicine is given by a healthcare provider as a shot under your skin or into a muscle. · You may be given medicine to numb the area where the shot will be injected. If you receive the medicine around your eyes, you may be given eye drops or ointment to numb the area. After your injection, you may need to wear a protective contact lens or eye patch. · If you are being treated for excessive sweating, shave your underarms but do not use deodorant for 24 hours before your injection. Avoid exercise, hot foods or liquids, or anything else that could make you sweat for 30 minutes before your injection. · The recommended treatment schedule for chronic migraine is every 12 weeks. · This medicine works slowly. Once your condition has improved, the medicine will last about 3 months, then the effects will slowly go away. You might need more injections to treat your condition. ¨ Muscle spasms in the eyelids should improve within 3 to 10 days. ¨ Eye muscle problems should improve 1 or 2 days after the injection, and the improvement should last for 2 to 6 weeks. ¨ Neck pain should improve within 2 to 6 weeks. ¨ Arm stiffness should improve within 4 to 6 weeks.   ¨ Facial lines or wrinkles should improve 1 or 2 days.  · This medicine should come with a Medication Guide. Ask your pharmacist for a copy if you do not have one. · Missed dose:Call your doctor or pharmacist for instructions. Drugs and Foods to Avoid:   Ask your doctor or pharmacist before using any other medicine, including over-the-counter medicines, vitamins, and herbal products. · Some foods and medicine can affect how onabotulinumtoxinA works. Tell your doctor if you are using any of the following:  ¨ Aspirin or a blood thinner (such as ticlopidine, warfarin)  ¨ Muscle relaxer  ¨ Medicine for an infection (such as amikacin, gentamicin, streptomycin, tobramycin)  · Tell your doctor if you have received an injection of any botulinum toxin product within the past 4 months. Warnings While Using This Medicine:   · Tell your doctor if you are pregnant or breastfeeding, or if you have breathing or lung problems, bleeding problems, heart or blood vessel disease, or nerve or muscle problems (such as myasthenia gravis). Tell your doctor if you have ever had face surgery or if you have a urinary tract infection or trouble urinating, diabetes, or multiple sclerosis. · This medicine may cause the following problems:  ¨ Muscle weakness, loss of bladder control, trouble swallowing, speaking, or breathing (caused by the toxin spreading to other parts of your body)  · This medicine may make your muscles weak or cause vision problems. Do not drive or do anything else that could be dangerous until you know how this medicine affects you. · There are some warnings that only apply if you are receiving this medicine to treat the following:   ¨ Injections near the eye: This medicine may reduce blinking, which can raise the risk of eye problems such as corneal exposure and ulcers. Tell your doctor right away if you notice that you are blinking less than usual or your eyes feel dry. ¨ Urinary incontinence:  This medicine may cause autonomic dysreflexia, which can be a life-threatening condition. ¨ Overactive bladder: Check with your doctor right away if you have trouble urinating or a burning sensation while urinating. · This medicine contains products from donated human blood, so it may contain viruses, although the risk is low. Human donors and blood are always tested for viruses to keep the risk low. Talk with your doctor about this risk if you are concerned. · Your doctor will check your progress and the effects of this medicine at regular visits. Keep all appointments. Possible Side Effects While Using This Medicine:   Call your doctor right away if you notice any of these side effects:  · Allergic reaction: Itching or hives, swelling in your face or hands, swelling or tingling in your mouth or throat, chest tightness, trouble breathing  · Blurred or double vision, droopy eyelids  · Change in how much or how often you urinate, trouble urinating, or painful urination  · Chest pain, slow or uneven heartbeat  · Headache, increased sweating, warmth or redness in your face, neck, or arm  · Muscle weakness  · Trouble swallowing, talking, or breathing  If you notice these less serious side effects, talk with your doctor:   · Fever, chills, cough, stuffy or runny nose, sore throat, and body aches  · Pain in your neck, back, arms, or legs  · Redness, pain, tenderness, bruising, swelling, or weakness where the shot was given  If you notice other side effects that you think are caused by this medicine, tell your doctor. Call your doctor for medical advice about side effects. You may report side effects to FDA at 4-365-FDA-1572  © 2017 [facility] Information is for End User's use only and may not be sold, redistributed or otherwise used for commercial purposes. The above information is an  only. It is not intended as medical advice for individual conditions or treatments.  Talk to your doctor, nurse or pharmacist before following any medical regimen to see if it is safe and effective for you. 10 [facility]   Statement to Patients  April 1, 2014      In an effort to ensure the large volume of patient prescription refills is processed in the most efficient and expeditious manner, we are asking our patients to assist us by calling your Pharmacy for all prescription refills, this will include also your  Mail Order Pharmacy. The pharmacy will contact our office electronically to continue the refill process. Please do not wait until the last minute to call your pharmacy. We need at least 48 hours (2days) to fill prescriptions. We also encourage you to call your pharmacy before going to  your prescription to make sure it is ready. With regard to controlled substance prescription refill requests (narcotic refills) that need to be picked up at our office, we ask your cooperation by providing us with at least 72 hours (3days) notice that you will need a refill. We will not refill narcotic prescription refill requests after 4:00pm on any weekday, Monday through Thursday, or after 2:00pm on Fridays, or on the weekends. We encourage everyone to explore another way of getting your prescription refill request processed using [employer], our patient web portal through our electronic medical record system. [employer] is an efficient and effective way to communicate your medication request directly to the office and  downloadable as an torsten on your smart phone . [employer] also features a review functionality that allows you to view your medication list as well as leave messages for your physician. Are you ready to get connected? If so please review the attatched instructions or speak to any of our staff to get you set up right away! Thank you so much for your cooperation. Should you have any questions please contact our Practice Administrator.     The Physicians and Staff,  [facility]

## 2019-09-20 ENCOUNTER — TELEPHONE (OUTPATIENT)
Dept: NEUROLOGY | Age: 39
End: 2019-09-20

## 2019-09-20 DIAGNOSIS — T80.90XA INJECTION SITE REACTION, INITIAL ENCOUNTER: Primary | ICD-10-CM

## 2019-09-20 RX ORDER — PREDNISONE 10 MG/1
TABLET ORAL
Qty: 21 TAB | Refills: 0 | Status: SHIPPED | OUTPATIENT
Start: 2019-09-20 | End: 2019-10-01

## 2019-09-20 RX ORDER — DIPHENHYDRAMINE HCL 25 MG
25 TABLET ORAL 2 TIMES DAILY
Qty: 14 TAB | Refills: 0 | Status: SHIPPED | OUTPATIENT
Start: 2019-09-20 | End: 2019-09-27

## 2019-10-01 ENCOUNTER — OFFICE VISIT (OUTPATIENT)
Dept: NEUROLOGY | Age: 39
End: 2019-10-01

## 2019-10-01 VITALS
OXYGEN SATURATION: 99 % | HEIGHT: 69 IN | SYSTOLIC BLOOD PRESSURE: 142 MMHG | WEIGHT: 207 LBS | DIASTOLIC BLOOD PRESSURE: 100 MMHG | BODY MASS INDEX: 30.66 KG/M2 | HEART RATE: 102 BPM

## 2019-10-01 DIAGNOSIS — T80.90XA INJECTION SITE REACTION, INITIAL ENCOUNTER: Primary | ICD-10-CM

## 2019-10-01 RX ORDER — DESVENLAFAXINE SUCCINATE 50 MG/1
TABLET, EXTENDED RELEASE ORAL
Refills: 11 | COMMUNITY
Start: 2019-08-30

## 2019-10-01 RX ORDER — PREDNISONE 20 MG/1
TABLET ORAL
Qty: 34 TAB | Refills: 0 | Status: SHIPPED | OUTPATIENT
Start: 2019-10-01 | End: 2019-10-25

## 2019-10-01 RX ORDER — DIHYDROERGOTAMINE MESYLATE 4 MG/ML
SPRAY NASAL
Refills: 11 | COMMUNITY
Start: 2019-09-25

## 2019-10-01 RX ORDER — GABAPENTIN 600 MG/1
600 TABLET ORAL
COMMUNITY

## 2019-10-01 RX ORDER — ONDANSETRON 4 MG/1
4 TABLET, FILM COATED ORAL
Qty: 42 TAB | Refills: 0 | Status: SHIPPED | OUTPATIENT
Start: 2019-10-01 | End: 2019-10-22

## 2019-10-01 NOTE — PATIENT INSTRUCTIONS
1. Will re-do Prednisone taper but do it at a higher dose for longer. Prednisone 20 mg tablets  Day 1-6: 60 mg/ day (AM with food)  Day 7-10: 40 mg/ day (AM with food)  Day 11-14: 20 mg/ day (AM with food)  THEN STOP    2. Increase Benadryl to 25 mg once in AM and 2 in PM. After you finish the steroid, then reduce Benadryl to 25 mg twice a day x 1 week, then once a day for 1 week then stop. 3. Will add Ondansetron 4 mg up to twice a day for medication related nausea. May cause drowsiness. 4. We can repeat the Botox injection but wait 1 additional month beyond the normal 3 month timeframe. Also, 3 days before the next Botox, start taking Benadryl 25 mg in Am and 50 mg in PM, to counteract any injection site reaction.

## 2019-10-01 NOTE — PROGRESS NOTES
Neurology Progress Note    Patient ID:   Benito Oh  [id number]  49 y.o.  1980    Date of Office Visit: 10/01/19    Chief Complaint   Patient presents with    Allergic Reaction     Botox injection     Interval Hx:     Performed first Botox injection for intractable migraines on 9-5-19. Pt reports that the following day she developed swelling and tenderness of both eyebrows and upper eyelids, no redness. Symptoms lasted for a few days then went away, then came back around a week after the Botox injection. She called the office back regarding the symptoms, suspected injection site reaction, so sent in Rx for 6 day course of Prednisone (starting at 60 mg then tapering off) and advised pt to add Benadryl 25 mg twice a day. Pt says when on the prednisone, the eyelid swelling/ heaviness didn't seem as bad but since off of it, eyelids feel heavy/ puffy and she feels like she's getting eye strain. No double vision. On exam face appears symmetric, forehead raising is symmetrically reduced due to the recent botox, no eyelid drooping on either side, can open/ close eyes w/o difficulty, EOMs are normal.  Both upper eyelids are puffy and tender to touch, no redness, no excessive warmth. Brief ROS: as noted above or otherwise negative       Objective:      Allergies   Allergen Reactions    Codeine Nausea and Vomiting    Demerol [Meperidine] Nausea and Vomiting    Keflex [Cephalexin] Itching    Pcn [Penicillins] Hives       PMHx:  Past Medical History:   Diagnosis Date    Anxiety     Arrhythmia     Sinus Tach    Arthritis     Chronic pain     Fatigue     Fibromyalgia     DAVEY (generalized anxiety disorder)     Headache     Hypertension     Muscle pain     Nausea & vomiting     Other ill-defined conditions(799.89)     fibromyalgia    Other ill-defined conditions(799.89)     hypoglycemia    Snoring     Unspecified adverse effect of anesthesia     pain at IV site and pain through the vein with anesthesia    Vertigo     Visual disturbance      PSHx:  has a past surgical history that includes hx orthopaedic (1993); hx orthopaedic (2001); and hx heent (5-2012). Current Outpatient Medications on File Prior to Visit   Medication Sig    dihydroergotamine (MIGRANAL) 0.5 mg/pump act. (4 mg/mL) nasal spray PLEASE SEE ATTACHED FOR DETAILED DIRECTIONS    desvenlafaxine succinate (PRISTIQ) 50 mg ER tablet TAKE 1 TABLET BY MOUTH EVERY DAY    gabapentin (NEURONTIN) 600 mg tablet Take 600 mg by mouth nightly.  AJOVY 225 mg/1.5 mL syrg INJECT 225 MG (1 SYRINGE) BY SUBCUTANEOUS ROUTE EVERY THIRTY (30) DAYS    onabotulinumtoxinA (BOTOX) 200 unit injection Inject 200 units IM into face/neck in 31 FDA approves sites    acetaminophen (TYLENOL) 325 mg cap Take  by mouth.  magnesium oxide 500 mg tab Take 1,000 mg by mouth daily.  clonazePAM (KLONOPIN) 0.5 mg tablet     verapamil (CALAN) 80 mg tablet TAKE 1 TABLET BY MOUTH TWICE A DAY    fexofenadine HCl (ALLEGRA PO) Take  by mouth.  melatonin 1 mg tablet Take  by mouth nightly.  ibuprofen (MOTRIN) 400 mg tablet Take 400 mg by mouth two (2) times daily as needed.  rizatriptan (MAXALT) 10 mg tablet May repeat in 2 hours if neededTAKE 1 TAB BY MOUTH ONCE AS NEEDED FOR MIGRAINE FOR UP TO 1 DOSE. MAY REPEAT IN 2 HOURS IF NEEDED    vortioxetine (TRINTELLIX) 10 mg tablet Take 20 mg by mouth. No current facility-administered medications on file prior to visit. Physical Exam  Vitals:    10/01/19 1145   BP: (!) 142/100   Pulse: (!) 102   SpO2: 99%   Weight: 93.9 kg (207 lb)   Height: 5' 9\" (1.753 m)       Exam as above      Assessment:       ICD-10-CM ICD-9-CM    1.  Injection site reaction, initial encounter T80.90XA 999.9 ondansetron hcl (ZOFRAN) 4 mg tablet      predniSONE (DELTASONE) 20 mg tablet      esomeprazole mag-glycerin 20 mg kcsp     Discussed with patient that there is no sign of infection (wouldn't be bilateral) but I do agree that she has an injection site reaction (both upper eyelids puffy, tender) rom the Botox on 9-5-19. She had less pain/ tenderness while on the Prednisone taper but it was only for 6 days. D/w her that I will need to put her on a longer prednisone taper as she still has the swelling. She was agreeable to that. Gave her the following instructions (written):      1. Will re-do Prednisone taper but do it at a higher dose for longer. Prednisone 20 mg tablets  Day 1-6: 60 mg/ day (AM with food)  Day 7-10: 40 mg/ day (AM with food)  Day 11-14: 20 mg/ day (AM with food)  THEN STOP    2. Increase Benadryl to 25 mg once in AM and 2 in PM. After you finish the steroid, then reduce Benadryl to 25 mg twice a day x 1 week, then once a day for 1 week then stop. 3. Will add Ondansetron 4 mg up to twice a day for medication related nausea. May cause drowsiness. 4. We can repeat the Botox injection but wait 1 additional month beyond the normal 3 month timeframe. Also, 3 days before the next Botox, start taking Benadryl 25 mg in Am and 50 mg in PM, to counteract any injection site reaction. Additionally, sent Rx for Esomeprazole (nexium) 20 mg/ day (take 1 capsule 1 hour before breakfast) x 17 days to prevent GI irritation while she's taking the prednisone (and for a few days after that).

## 2019-10-01 NOTE — PROGRESS NOTES
Pt is here today for an allergic reaction to botox injection that she received on September 5th. She is c/o of swelling around her eyes and eye muscle pain which is triggering her migraines.

## 2019-10-17 ENCOUNTER — TELEPHONE (OUTPATIENT)
Dept: NEUROLOGY | Age: 39
End: 2019-10-17

## 2019-10-17 DIAGNOSIS — G43.009 MIGRAINE WITHOUT AURA AND WITHOUT STATUS MIGRAINOSUS, NOT INTRACTABLE: ICD-10-CM

## 2019-10-17 NOTE — TELEPHONE ENCOUNTER
----- Message from [employer] Manual sent at 10/17/2019  3:36 PM EDT -----  Regarding: Dr. Alphonso Shirley Message/Vendor Calls    Caller's first and last name:    PT      Reason for call:  Prior authorization needed for \"Ajovy\" Injection      Callback required yes/no and why:  Yes, confirming when processed      Best contact number(s):   H/[phone]      Details to clarify the request:   Pt stated, [facility] [place] (on file) advised prior authorization is needed.   Pt has [employer]BS (PP[place]) effective 10/01/19 [phone]      [employer] Manual [FreeTextEntry1] : UPDATES FROM DERMATOLOGY AND RHEUMATOLOGY [de-identified] : PRESENTING FOR FOLLOW-UP.  HAD SKIN BIOPSY WITH DERMATOLOGY AND DIAGNOSED WITH CUTANEOUS LUPUS.  ADVISED NOT SYSTEMIC LUPUS.  STARTED ON PLAQUENIL AND THEN DAPSONE.  TO FOLLOW-UP WITH DERMATOLOGY IN TWO WEEKS AND WILL ALSO FOLLOW-UP WITH RHEUMATOLOGY.  SKIN NOW "MUCH BETTER".  HISTORY OF VITAMIN D INSUFFICIENCY ON VITAMIN D SUPPLEMENTS AND VITAMIN B12 DEFICIENCY ON VITAMIN B12 SUPPLEMENTS.  USING CPAP FOR EDITH.  CHRONIC HISTORY OF OBESITY.  DIET IS "OKAY".  EXERCISING TWICE A WEEK AT THE GYM.  CARDIO AND WEIGHTS.  UP TO DATE WITH GYN, MAMMOGRAM AND COLONOSCOPY.  NO OTHER COMPLAINTS TODAY.

## 2019-10-18 NOTE — TELEPHONE ENCOUNTER
Prior Auth APPROVED for Ajovy by [employer] via phone call with ES Rep Azalia Gallego. Effective dates 09/18/19 - 10/17/20. Case #[id number]. Approval will be scanned into media for review. Called patient and informed her of approval. Pharmacy will be notified automatically. Please send Rx to pharmacy if necessary.

## 2019-10-23 ENCOUNTER — DOCUMENTATION ONLY (OUTPATIENT)
Dept: NEUROLOGY | Age: 39
End: 2019-10-23

## 2019-10-23 NOTE — PROGRESS NOTES
Spoke with patient this AM.  Discussed going to ER today versus calling her Allergy Physician. She says she will call her Allergist and see if they can get her in asap for treatment. If not, she understands to go to the ER for treatment of allergic reaction to Botox.

## 2019-10-25 ENCOUNTER — TELEPHONE (OUTPATIENT)
Dept: NEUROLOGY | Age: 39
End: 2019-10-25

## 2019-10-25 DIAGNOSIS — T48.295S: Primary | ICD-10-CM

## 2019-10-25 RX ORDER — PYRIDOSTIGMINE BROMIDE 30 MG/1
15 TABLET ORAL
Qty: 5 TAB | Refills: 0 | Status: SHIPPED | OUTPATIENT
Start: 2019-10-25

## 2019-10-25 NOTE — TELEPHONE ENCOUNTER
Spoke with patient. Saw Allergist, he/ she doesn't believe this is allergic reaction. Pt continues to c/o droop eyelids (bilateral), some days improved/ less noticeable. More likely this is cosmetic side effect of the Botox Injection. D/w patient it can take the full Botox cycle (3 months) to wear off but there should be some gradual improvement over that time, certainly by the 6 week shirley.  read about potential reversal medication for Botulinium Toxin (pyridostigmine) and questioned whether it could help speed up resolution. D/w with Pt that not clear that it will help, and using it will be off label. She felt her quality of life was significantly impaired from the cosmetic side effect and was open to trying the pyridostigmine. Will send in Rx for Pyridostigmine 60 mg HALF tab twice a day for 5 days and see if that helps. Common SEFx were discussed.

## 2020-11-06 ENCOUNTER — TRANSCRIBE ORDER (OUTPATIENT)
Dept: SCHEDULING | Age: 40
End: 2020-11-06

## 2020-11-06 DIAGNOSIS — H93.239 HYPERACUSIS: Primary | ICD-10-CM

## 2020-11-23 ENCOUNTER — HOSPITAL ENCOUNTER (OUTPATIENT)
Dept: CT IMAGING | Age: 40
Discharge: HOME | End: 2020-11-23
Attending: OTOLARYNGOLOGY
Payer: COMMERCIAL

## 2020-11-23 DIAGNOSIS — H93.239 HYPERACUSIS: ICD-10-CM

## 2020-11-23 PROCEDURE — 70480 CT ORBIT/EAR/FOSSA W/O DYE: CPT
